# Patient Record
Sex: MALE | Race: WHITE | NOT HISPANIC OR LATINO | URBAN - METROPOLITAN AREA
[De-identification: names, ages, dates, MRNs, and addresses within clinical notes are randomized per-mention and may not be internally consistent; named-entity substitution may affect disease eponyms.]

---

## 2021-04-14 DIAGNOSIS — Z23 ENCOUNTER FOR IMMUNIZATION: ICD-10-CM

## 2021-06-02 ENCOUNTER — TRANSCRIBE ORDERS (OUTPATIENT)
Dept: SCHEDULING | Facility: REHABILITATION | Age: 71
End: 2021-06-02

## 2021-06-02 DIAGNOSIS — R41.841 COGNITIVE COMMUNICATION DEFICIT: Primary | ICD-10-CM

## 2021-07-02 ENCOUNTER — HOSPITAL ENCOUNTER (OUTPATIENT)
Dept: SPEECH THERAPY | Facility: REHABILITATION | Age: 71
Setting detail: THERAPIES SERIES
Discharge: HOME | End: 2021-07-02
Attending: PSYCHIATRY & NEUROLOGY
Payer: MEDICARE

## 2021-07-02 DIAGNOSIS — R41.841 COGNITIVE COMMUNICATION DEFICIT: ICD-10-CM

## 2021-07-02 PROCEDURE — 92523 SPEECH SOUND LANG COMPREHEN: CPT | Mod: GN

## 2021-07-02 NOTE — Clinical Note
[address] [place] [address]  [phone]      Dear DR. Moses,      Thank you for this referral. Please review the attached notes and plan of care for your approval.  Please contact our department with any questions.     Sincerely,     Lisa Cole, CCC-SLP    Referring Provider: By co-signing this Plan of Care (POC) either electronically or physically you agree to the following:    I have reviewed the the Plan of Care established by the therapist within this document and certify that the services are skilled and medically necessary. I have reviewed the plan and recommend that these services continue to meet the goals stated in this document.     EXTERNAL PROVIDER FAXING BACK:    PHYSICIAN SIGNATURE: __________________________________     DATE: ___________________   TIME: _________    IMPORTANT:  If returning this Plan of Care by fax, please fax back ONLY the signature page.   ________________________________________________________________      [facility] Fax: [phone]      SLP EVALUATION FOR OUTPATIENT THERAPY    Patient: Khanh Dela Cruz   MRN: [id number]  : 1950 71 y.o.  Referring Physician: Amita Moses MD  Date of Visit: 2021      Certification Dates:  21 through 10/01/21    Recommended Frequency & Duration:  2 times/week (1-2x per week) for up to 3 months        Diagnosis:   1. Cognitive communication deficit        Chief Complaints:   Chief Complaint   Patient presents with   • Memory Loss   • Cognition       Precautions: no known precautions/restrictions    Past Medical History: No past medical history on file.    Past Surgical History: No past surgical history on file.      LEARNING ASSESSMENT    Assessment completed: Yes    Learner name:  Khanh Dela Cruz    Relationship: Patient    Learning Barriers:  Learning barriers: No Barriers    Preferred Language: English     Needed: No    Learning New Concepts: Listening, Reading and  Demonstration      CO-LEARNER ASSESSMENT:    Completed: No      OBJECTIVE MEASUREMENTS/DATA:    Assessment - 07/04/21 0943        Assessment    Plan of Care reviewed and patient/family in agreement  Yes     Rehab Potential/Prognosis (SLP)  good, to achieve stated therapy goals     Problem List  Impaired cognition;Impaired communication     Clinical Assessment  Pt is a 71 y.o. male who presents to [facility] for speech therapy services to address recent complaints over approx. 1-2 years of worsening memory and word retrieval difficulties.  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  Pt reports memory difficulties and provided examples such as forgetting where his phone and keys are, as well as forgetting where he cruz when shopping.  Brief objective measures indicate difficulty with WM and immediate recall for specific information.  Pt also intimated possible attention difficulties, which may be negatively affecting both recall and exec function skills. Assessment of attention in upcoming session as well as executive function.  Results of Neuropsych assessment in 2/2021 indicated immediate memory and delayed memory in the Low Average range, brief attention in Low Average range, and for processing speed of a task assessing psychomotor speed and auditory attention, in the borderline range.  Executive functioning, which assessed attentional capacity and sequencing, pt scored in Extremely Low range.  Pt had reported an increase in depression over the last year, with psychologist noting depression as the primary contributor to pt's cognitive syptoms at that time.  Recommendations were made for improved functioning including atten and memory strategies.  It is anticipated that pt will benefit from skilled ST services to further assess recall, attention and exeutive function skills and to  "train in strategies for consistent use and carryover into home/work settings.     Planned Services  CPT 52500 Speech Lang Voice Comm and/or Auditory Proc (Treatment of speech, language, voice, communication and/or hearing processing disorder)         General Information - 07/02/21 0933        Session Details    Document Type  initial evaluation     Mode of Treatment  individual therapy;speech language pathology        SLP Time Calculation    Start Time  0815     Stop Time  0915     Time Calculation (min)  60 min        General Information    Referring Physician  Amita Moses MD     Pertinent History of Current Functional Problem  Pt is a 71 y.o. male who presents to [facility] to address recent memory difficulties.  Pt reports he was a  in a company in the 80s, was a  at [employer], and then broke off [employer].  He currently is semi-retired.  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  Pt reports increased difficulty with recalling what he is doing.  Examples provided included Googling something, but forgetting what he is googling, forgetting people's names, losing his car keys and phone, forgetting where he is parked.  Pt also reported it is getting more difficult to do things he had no difficulty doing previously (eg setting up accounts, etc).  Pt also reported that at times he feels \"foggy.\"  Pt reported having a neuropsychological assessment this year and has provided (discussed in \"assessment\" section).      Existing Precautions/Restrictions  no known precautions/restrictions         Pain and Vitals - 07/02/21 0815        Pain Assessment    Currently in pain  No/Denies        Pain Interventions    Intervention  none     Post Intervention Comments  none         SLP - 07/04/21 1017        SLP Frequency and Duration    Speech Duration  3 months     SLP Cert To  10/01/21     Date SLP POC was sent to provider  07/04/21     " "    Falls Assessment - 07/02/21 0942        Initial Falls Assessment    One or more falls in the last year  Yes     How many times  2 or more     Was the patient injured in any fall  No           Living Environment   Living Environment - 07/02/21 0940        Living Environment    People in Home  spouse     Living Arrangements  house     Transportation Concerns  car, none        Relationship/Environment    Name(s) of People in Home  Pt lives with wife Michelle MACIAS   Prior Level of Function - 07/02/21 0941        OTHER    Previous level of function  Pt currently \"semi-retired.\" 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         Language Assessment    Language Assessment - 07/04/21 0937        Verbal Expression    Comment, Intervention (Verbal Expression)  Pt reports word retrieval difficulties, but none noted during today's evaluation during interview, etc.  Will assess with SOPHIE F-A-S and instruct in compensatory strategies.        Auditory Comprehension    Follows Commands (Auditory Comprehension)  [facility]         Cognition   Executive Function and Cognition - 07/04/21 0921        Orientation    Orientation Comments:   AOx4        Memory    Immediate memory  RIPA 2 Immediate Memory Subtest: 28/30; HCLS Compelx Ideational Recall: recall of specific information via open-ended questions: 5/10 (50%)     Working memory  3 words: Pass; 4 words: Fail - pt failed to recall 1/4 words from string.        Attention    Sustained Attention  Further assess sustained attn, time managment, organization and planning skills.  Discussed that decreased attention may negatively affect recall.         Functional Comm Measures   Functional Communication Measures - 07/04/21 0939        Functional Communication Measures    FCM: Memory  5-->Level 5           TREATMENT PLAN:        GOALS:    Goals        General    •  ST STGs and LTGs (pt-stated)       Improve " memory    Short Term Goals Time Frame  Result  Comment/Progress    Pt. to demonstrate increased ST/Working memory to >/=85% with min cues/assist to apply strategies.  6-8 weeks New 7/2/2021: Noted deficits with WM and IM.   Pt. to demonstrate improved new learning/retention of information  to >/=85% with min cues for use of strategies.   (paragraph recall/NELL Talks, games) 6-8 weeks New     Pt. to demonstrate use of external memory aides at  mod I level to maintain current work/home schedules.    6-8 weeks New     Pt. to demonstrate intact higher level word retrieval/thought organization efficiency to >/=85% with min cues for use of strategies.  6-8 weeks New     Pt. to demonstrate intact auditory attention/concentration and focus for mid-higher level attention tasks to >85%% accuracy (for >30 mins) 6-8 weeks  New     Pt. to demonstrate intact auditory processing speed/comp to >/=85% via APT assessment.   6-8 weeks New     Pt. to utilize organization and planning skills to manage mod-high level tasks w/min assist for strategy use. 6-8 weeks New     LTG #1: FCM 6-7 for Memory 12 weeks New 7/2/2021: 5 for Memory   LTG #2: Pt to use compensatory cog comm strategies at mod I level for improved overall function in home/work settings.   12 weeks New                  EVALUATION AND ASSESSMENT:    Assessment - 07/04/21 0943        Assessment    Plan of Care reviewed and patient/family in agreement  Yes     Rehab Potential/Prognosis (SLP)  good, to achieve stated therapy goals     Problem List  Impaired cognition;Impaired communication     Clinical Assessment  Pt is a 71 y.o. male who presents to [facility] for speech therapy services to address recent complaints over approx. 1-2 years of worsening memory and word retrieval difficulties.  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  Pt reports  memory difficulties and provided examples such as forgetting where his phone and keys are, as well as forgetting where he cruz when shopping.  Brief objective measures indicate difficulty with WM and immediate recall for specific information.  Pt also intimated possible attention difficulties, which may be negatively affecting both recall and exec function skills. Assessment of attention in upcoming session as well as executive function.  Results of Neuropsych assessment in 2/2021 indicated immediate memory and delayed memory in the Low Average range, brief attention in Low Average range, and for processing speed on a task assessmig psychomotor speed and auditory attention, in the borderline range.  Executive functioning, which assessed attentional capcity and sequencing, pt scored in Extremely Low range.  Pt had reported an increase in depression over the last year, with psychologist noting deprssion as the primary contributor to pt's cognitive syptoms at that time.  Recommendations were made for improved functioning.  It is anticipated that pt will benefit from skilled ST services to further assess recall, attention and exeutive function skills and to train in strategies for consistent use and carryover into home/work settings.     Planned Services  CPT 20630 Speech Lang Voice Comm and/or Auditory Proc (Treatment of speech, language, voice, communication and/or hearing processing disorder)           This 71 y.o. year old male presents to ST with above stated diagnosis. Speech Therapy evaluation reveals Impaired cognition, Impaired communication resulting in functional limitations. Khanh Dela Cruz will benefit from skilled ST services to address limitation, work towards rehab and patient goals and maximize PLOF of chosen ADLs.     Planned Services:  Speech therapies will focus on CPT 61448 Speech Lang Voice Comm and/or Auditory Proc (Treatment of speech, language, voice, communication and/or hearing processing  disorder), .

## 2021-07-04 RX ORDER — OMEPRAZOLE 40 MG/1
40 CAPSULE, DELAYED RELEASE ORAL
COMMUNITY

## 2021-07-04 RX ORDER — METFORMIN HYDROCHLORIDE 500 MG/1
500 TABLET ORAL 2 TIMES DAILY WITH MEALS
COMMUNITY

## 2021-07-04 RX ORDER — POTASSIUM CHLORIDE 20 MEQ/1
20 TABLET, EXTENDED RELEASE ORAL 2 TIMES DAILY
COMMUNITY

## 2021-07-04 RX ORDER — HYDROCORTISONE VALERATE CREAM 2 MG/G
CREAM TOPICAL 2 TIMES DAILY
COMMUNITY

## 2021-07-04 RX ORDER — ECONAZOLE NITRATE 10 MG/G
CREAM TOPICAL DAILY
COMMUNITY

## 2021-07-04 RX ORDER — ESCITALOPRAM OXALATE 10 MG/1
10 TABLET ORAL DAILY
COMMUNITY

## 2021-07-04 RX ORDER — METOLAZONE 2.5 MG/1
2.5 TABLET ORAL DAILY
COMMUNITY

## 2021-07-04 RX ORDER — CLOTRIMAZOLE AND BETAMETHASONE DIPROPIONATE 10; .64 MG/G; MG/G
CREAM TOPICAL 2 TIMES DAILY
COMMUNITY

## 2021-07-04 RX ORDER — FUROSEMIDE 20 MG/1
20 TABLET ORAL DAILY
COMMUNITY

## 2021-07-04 RX ORDER — SULFASALAZINE 500 MG/1
500 TABLET ORAL 4 TIMES DAILY
COMMUNITY

## 2021-07-04 RX ORDER — ERYTHROMYCIN 5 MG/G
OINTMENT OPHTHALMIC NIGHTLY
COMMUNITY

## 2021-07-04 NOTE — PROGRESS NOTES
Referring Provider: By co-signing this Plan of Care (POC) either electronically or physically you agree to the following:    I have reviewed the the Plan of Care established by the therapist within this document and certify that the services are skilled and medically necessary. I have reviewed the plan and recommend that these services continue to meet the goals stated in this document.     EXTERNAL PROVIDER FAXING BACK:    PHYSICIAN SIGNATURE: __________________________________     DATE: ___________________   TIME: _________    IMPORTANT:  If returning this Plan of Care by fax, please fax back ONLY the signature page.   ________________________________________________________________      Ne[facility] Fax: [phone]      SLP EVALUATION FOR OUTPATIENT THERAPY    Patient: Khanh Dela Cruz   MRN: [id number]  : 1950 71 y.o.  Referring Physician: Amita Moses MD  Date of Visit: 2021      Certification Dates:  21 through 10/01/21    Recommended Frequency & Duration:  2 times/week (1-2x per week) for up to 3 months        Diagnosis:   1. Cognitive communication deficit        Chief Complaints:   Chief Complaint   Patient presents with   • Memory Loss   • Cognition       Precautions: no known precautions/restrictions    Past Medical History: No past medical history on file.    Past Surgical History: No past surgical history on file.      LEARNING ASSESSMENT    Assessment completed: Yes    Learner name:  Khanh Dela Cruz    Relationship: Patient    Learning Barriers:  Learning barriers: No Barriers    Preferred Language: English     Needed: No    Learning New Concepts: Listening, Reading and Demonstration      CO-LEARNER ASSESSMENT:    Completed: No      OBJECTIVE MEASUREMENTS/DATA:    Assessment - 21 0943        Assessment    Plan of Care reviewed and patient/family in agreement  Yes     Rehab Potential/Prognosis (SLP)  good, to achieve stated therapy goals      Problem List  Impaired cognition;Impaired communication     Clinical Assessment  Pt is a 71 y.o. male who presents to [facility] clinic for speech therapy services to address recent complaints over approx. 1-2 years of worsening memory and word retrieval difficulties.  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  Pt reports memory difficulties and provided examples such as forgetting where his phone and keys are, as well as forgetting where he cruz when shopping.  Brief objective measures indicate difficulty with WM and immediate recall for specific information.  Pt also intimated possible attention difficulties, which may be negatively affecting both recall and exec function skills. Assessment of attention in upcoming session as well as executive function.  Results of Neuropsych assessment in 2/2021 indicated immediate memory and delayed memory in the Low Average range, brief attention in Low Average range, and for processing speed of a task assessing psychomotor speed and auditory attention, in the borderline range.  Executive functioning, which assessed attentional capacity and sequencing, pt scored in Extremely Low range.  Pt had reported an increase in depression over the last year, with psychologist noting depression as the primary contributor to pt's cognitive syptoms at that time.  Recommendations were made for improved functioning including atten and memory strategies.  It is anticipated that pt will benefit from skilled ST services to further assess recall, attention and exeutive function skills and to train in strategies for consistent use and carryover into home/work settings.     Planned Services  CPT 41581 Speech Lang Voice Comm and/or Auditory Proc (Treatment of speech, language, voice, communication and/or hearing processing disorder)         General Information - 07/02/21  "0933        Session Details    Document Type  initial evaluation     Mode of Treatment  individual therapy;speech language pathology        SLP Time Calculation    Start Time  0815     Stop Time  0915     Time Calculation (min)  60 min        General Information    Referring Physician  Amita Moses MD     Pertinent History of Current Functional Problem  Pt is a 71 y.o. male who presents to [facility] to address recent memory difficulties.  Pt reports he was a  in a company in the 80s, was a  at [employer], and then broke off [employer].  He currently is semi-retired.  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  Pt reports increased difficulty with recalling what he is doing.  Examples provided included Googling something, but forgetting what he is googling, forgetting people's names, losing his car keys and phone, forgetting where he is parked.  Pt also reported it is getting more difficult to do things he had no difficulty doing previously (eg setting up accounts, etc).  Pt also reported that at times he feels \"foggy.\"  Pt reported having a neuropsychological assessment this year and has provided (discussed in \"assessment\" section).      Existing Precautions/Restrictions  no known precautions/restrictions         Pain and Vitals - 07/02/21 0815        Pain Assessment    Currently in pain  No/Denies        Pain Interventions    Intervention  none     Post Intervention Comments  none         SLP - 07/04/21 1017        SLP Frequency and Duration    Speech Duration  3 months     SLP Cert To  10/01/21     Date SLP POC was sent to provider  07/04/21         Falls Assessment - 07/02/21 0942        Initial Falls Assessment    One or more falls in the last year  Yes     How many times  2 or more     Was the patient injured in any fall  No           Living Environment   Living Environment - 07/02/21 0940        Living Environment    People " "in Home  spouse     Living Arrangements  house     Transportation Concerns  car, none        Relationship/Environment    Name(s) of People in Home  Pt lives with wife Michelle MACIAS   Prior Level of Function - 07/02/21 0941        OTHER    Previous level of function  Pt currently \"semi-retired.\" 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         Language Assessment    Language Assessment - 07/04/21 0937        Verbal Expression    Comment, Intervention (Verbal Expression)  Pt reports word retrieval difficulties, but none noted during today's evaluation during interview, etc.  Will assess with SOHPIE F-A-S and instruct in compensatory strategies.        Auditory Comprehension    Follows Commands (Auditory Comprehension)  [facility]         Cognition   Executive Function and Cognition - 07/04/21 0921        Orientation    Orientation Comments:   AOx4        Memory    Immediate memory  RIPA 2 Immediate Memory Subtest: 28/30; HCLS Compelx Ideational Recall: recall of specific information via open-ended questions: 5/10 (50%)     Working memory  3 words: Pass; 4 words: Fail - pt failed to recall 1/4 words from string.        Attention    Sustained Attention  Further assess sustained attn, time managment, organization and planning skills.  Discussed that decreased attention may negatively affect recall.         Functional Comm Measures   Functional Communication Measures - 07/04/21 0939        Functional Communication Measures    FCM: Memory  5-->Level 5           TREATMENT PLAN:        GOALS:    Goals        General    •  ST STGs and LTGs (pt-stated)       Improve memory    Short Term Goals Time Frame  Result  Comment/Progress    Pt. to demonstrate increased ST/Working memory to >/=85% with min cues/assist to apply strategies.  6-8 weeks New 7/2/2021: Noted deficits with WM and IM.   Pt. to demonstrate improved new learning/retention of " information  to >/=85% with min cues for use of strategies.   (paragraph recall/NELL Talks, games) 6-8 weeks New     Pt. to demonstrate use of external memory aides at  mod I level to maintain current work/home schedules.    6-8 weeks New     Pt. to demonstrate intact higher level word retrieval/thought organization efficiency to >/=85% with min cues for use of strategies.  6-8 weeks New     Pt. to demonstrate intact auditory attention/concentration and focus for mid-higher level attention tasks to >85%% accuracy (for >30 mins) 6-8 weeks  New     Pt. to demonstrate intact auditory processing speed/comp to >/=85% via APT assessment.   6-8 weeks New     Pt. to utilize organization and planning skills to manage mod-high level tasks w/min assist for strategy use. 6-8 weeks New     LTG #1: FCM 6-7 for Memory 12 weeks New 7/2/2021: 5 for Memory   LTG #2: Pt to use compensatory cog comm strategies at mod I level for improved overall function in home/work settings.   12 weeks New                  EVALUATION AND ASSESSMENT:    Assessment - 07/04/21 0943        Assessment    Plan of Care reviewed and patient/family in agreement  Yes     Rehab Potential/Prognosis (SLP)  good, to achieve stated therapy goals     Problem List  Impaired cognition;Impaired communication     Clinical Assessment  Pt is a 71 y.o. male who presents to [facility] for speech therapy services to address recent complaints over approx. 1-2 years of worsening memory and word retrieval difficulties.  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  Pt reports memory difficulties and provided examples such as forgetting where his phone and keys are, as well as forgetting where he cruz when shopping.  Brief objective measures indicate difficulty with WM and immediate recall for specific information.  Pt also intimated possible  attention difficulties, which may be negatively affecting both recall and exec function skills. Assessment of attention in upcoming session as well as executive function.  Results of Neuropsych assessment in 2/2021 indicated immediate memory and delayed memory in the Low Average range, brief attention in Low Average range, and for processing speed on a task assessmig psychomotor speed and auditory attention, in the borderline range.  Executive functioning, which assessed attentional capcity and sequencing, pt scored in Extremely Low range.  Pt had reported an increase in depression over the last year, with psychologist noting deprssion as the primary contributor to pt's cognitive syptoms at that time.  Recommendations were made for improved functioning.  It is anticipated that pt will benefit from skilled ST services to further assess recall, attention and exeutive function skills and to train in strategies for consistent use and carryover into home/work settings.     Planned Services  CPT 14607 Speech Lang Voice Comm and/or Auditory Proc (Treatment of speech, language, voice, communication and/or hearing processing disorder)           This 71 y.o. year old male presents to ST with above stated diagnosis. Speech Therapy evaluation reveals Impaired cognition, Impaired communication resulting in functional limitations. Khanh Dela Cruz will benefit from skilled ST services to address limitation, work towards rehab and patient goals and maximize PLOF of chosen ADLs.     Planned Services:  Speech therapies will focus on CPT 34774 Speech Lang Voice Comm and/or Auditory Proc (Treatment of speech, language, voice, communication and/or hearing processing disorder), .

## 2021-07-20 ENCOUNTER — HOSPITAL ENCOUNTER (OUTPATIENT)
Dept: SPEECH THERAPY | Facility: REHABILITATION | Age: 71
Setting detail: THERAPIES SERIES
Discharge: HOME | End: 2021-07-20
Attending: PSYCHIATRY & NEUROLOGY
Payer: MEDICARE

## 2021-07-20 DIAGNOSIS — R41.841 COGNITIVE COMMUNICATION DEFICIT: Primary | ICD-10-CM

## 2021-07-20 PROCEDURE — 92507 TX SP LANG VOICE COMM INDIV: CPT | Mod: GN

## 2021-07-20 NOTE — OP SLP TREATMENT LOG
"Cognitive flowsheet    COGNITIVE GOALS                       CURRENT SESSION   Pt. to demonstrate increased ST/Working memory to >/=85% with min cues/assist to apply strategies.  7/20/2021: Instructed in compensatory memory strategies and provided examples for each.  Pt reports using a calendar to keep up with appts. Pt continues to report decreased WM.  Discussed writing info down as he thinks of things (eg. Index card) for improved recall.     Pt. to demonstrate improved new learning/retention of information  to >/=85% with min cues for use of strategies.   (paragraph recall/NELL Talks, games)     Pt. to demonstrate use of external memory aides at  mod I level to maintain current work/home schedules.    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  Pt reports these two calendars work well for him. Pt showed SLP a bundle of business cards that had various notes on them.  Discussed taking notes from cards, which were not necessarily related to business card they were on, and placing in notes, or alternatively in calendar to \"jog\" patient to follow up if indicated.  Discussed use of phone alerts.     Pt. to demonstrate intact higher level word retrieval/thought organization efficiency to >/=85% with min cues for use of strategies.      Pt. to demonstrate intact auditory attention/concentration and focus for mid-higher level attention tasks to >85%% accuracy (for >30 mins) 7/20/2021: [place]  Discussed choosing schedule that would work best for him and to make it a routine (as recommended by neuropsych evaluation).  Also discussed reward when he completes task.  Discussed best times of day to work.     Pt. to demonstrate intact auditory processing speed/comp to >/=85% via APT assessment.       Pt. to utilize organization and planning skills to manage mod-high level " tasks w/min assist for strategy use.     Education    Other 7/20/21: Reviewed pt's neuropsychological evaluation and discussed strategies recommended in report, specifically behavioral modifications including daily routines to assist with completion of tasks, using a central location for phone, keys, etc.  Discussed placing phone in pocket vs room that he is in to help with phone location.

## 2021-07-21 NOTE — PROGRESS NOTES
SLP DAILY NOTE FOR OUTPATIENT THERAPY    Patient: Khanh Dela Cruz   MRN: [id number]  : 1950 71 y.o.  Referring Physician: Amita Moses MD  Date of Visit: 2021      Certification Dates: 21 through 10/01/21    Diagnosis:   1. Cognitive communication deficit        Chief Complaints:       Precautions:  Existing Precautions/Restrictions: no known precautions/restrictions       TODAY'S VISIT:    History/Vitals/Pain/Encounter Info - 21 1108        Injury History/Precautions/Daily Required Info    Primary Therapist  Lisa Cole MA CCC-SLP     Referring Physician  Amita Santizo MD     Existing Precautions/Restrictions  no known precautions/restrictions     Speech Therapy  Cognition     Document Type  daily treatment     Start Time  1105     Stop Time  1200     Time Calculation (min)  55 min     Patient reported fall since last visit  No        Pain Assessment    Currently in pain  No/Denies        Pain Interventions    Intervention  none     Post Intervention Comments  none         Daily Treatment Assessment and Plan - 21 1339        Daily Treatment Assessment and Plan    Progress toward goals  Progressing     Daily Outcome Summary  Instructed in compensatory memory strategies; discussed recommendations per neuropsych evaluation which might assist with completion of tasks, memory for placement of items, etc.     Plan and Recommendations  Assess word retrieval, attention and processing, WM and delayed recall.            OBJECTIVE MEASUREMENTS TAKEN TODAY:    Today's Treatment:    Cognitive flowsheet    COGNITIVE GOALS                       CURRENT SESSION   Pt. to demonstrate increased ST/Working memory to >/=85% with min cues/assist to apply strategies.  2021: Instructed in compensatory memory strategies and provided examples for each.  Pt reports using a calendar to keep up with appts. Pt continues to report decreased WM.  Discussed writing info down as he thinks  "of things (eg. Index card) for improved recall.     Pt. to demonstrate improved new learning/retention of information  to >/=85% with min cues for use of strategies.   (paragraph recall/NELL Talks, games)     Pt. to demonstrate use of external memory aides at  mod I level to maintain current work/home schedules.    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  Pt reports these two calendars work well for him. Pt showed SLP a bundle of business cards that had various notes on them.  Discussed taking notes from cards, which were not necessarily related to business card they were on, and placing in notes, or alternatively in calendar to \"jog\" patient to follow up if indicated.  Discussed use of phone alerts.     Pt. to demonstrate intact higher level word retrieval/thought organization efficiency to >/=85% with min cues for use of strategies.      Pt. to demonstrate intact auditory attention/concentration and focus for mid-higher level attention tasks to >85%% accuracy (for >30 mins) 7/20/2021: Pt [place]  Discussed choosing schedule that would work best for him and to make it a routine (as recommended by neuropsych evaluation).  Also discussed reward when he completes task.  Discussed best times of day to work.     Pt. to demonstrate intact auditory processing speed/comp to >/=85% via APT assessment.       Pt. to utilize organization and planning skills to manage mod-high level tasks w/min assist for strategy use.     Education    Other 7/20/21: Reviewed pt's neuropsychological evaluation and discussed strategies recommended in report, specifically behavioral modifications including daily routines to assist with completion of tasks, using a central location for phone, keys, etc.  Discussed placing phone in pocket vs room that he is in to help with phone location.   "

## 2021-07-22 ENCOUNTER — HOSPITAL ENCOUNTER (OUTPATIENT)
Dept: SPEECH THERAPY | Facility: REHABILITATION | Age: 71
Setting detail: THERAPIES SERIES
Discharge: HOME | End: 2021-07-22
Attending: PSYCHIATRY & NEUROLOGY
Payer: MEDICARE

## 2021-07-22 DIAGNOSIS — R41.841 COGNITIVE COMMUNICATION DEFICIT: Primary | ICD-10-CM

## 2021-07-22 PROCEDURE — 92507 TX SP LANG VOICE COMM INDIV: CPT | Mod: GN

## 2021-07-22 NOTE — OP SLP TREATMENT LOG
"Cognitive flowsheet    COGNITIVE GOALS                       CURRENT SESSION   Pt. to demonstrate increased ST/Working memory to >/=85% with min cues/assist to apply strategies.  7/20/2021: Instructed in compensatory memory strategies and provided examples for each.  Pt reports using a calendar to keep up with appts. Pt continues to report decreased WM.  Discussed writing info down as he thinks of things (eg. Index card) for improved recall.     Pt. to demonstrate improved new learning/retention of information  to >/=85% with min cues for use of strategies.   (paragraph recall/NELL Talks, games)     Pt. to demonstrate use of external memory aides at  mod I level to maintain current work/home schedules.    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  Pt reports these two calendars work well for him. Pt showed SLP a bundle of business cards that had various notes on them.  Discussed taking notes from cards, which were not necessarily related to business card they were on, and placing in notes, or alternatively in calendar to \"jog\" patient to follow up if indicated.  Discussed use of phone alerts.     Pt. to demonstrate intact higher level word retrieval/thought organization efficiency to >/=85% with min cues for use of strategies.  7/22/2021: SOPHIE F-A-S: 44 (ave 48: SD13) - WNL.    Pt. to demonstrate intact auditory attention/concentration and focus for mid-higher level attention tasks to >85%% accuracy (for >30 mins) 7/22/2021: Auditory Divided Attn: Orally presented names - pt had to listen for names beginning with an /m/ and names ending with a long /e/: 36/42 (86%); 34/38 (89%). Approx. 4 mins in length.    AQT: A-1 Color: 28 sec (<25 second) - slower than normal; Form: 53 sec (<30 sec) - non normal; Color/Form: 81 sec (<60 sec) - non normal.     7/20/2021: Pt [place]  Discussed choosing schedule that would work best for him and to make it a routine (as recommended by neuropsych evaluation).  Also discussed reward when he completes task.  Discussed best times of day to work.     Pt. to demonstrate intact auditory processing speed/comp to >/=85% via APT assessment.       Pt. to utilize organization and planning skills to manage mod-high level tasks w/min assist for strategy use.     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      7/20/21: Reviewed pt's neuropsychological evaluation and discussed strategies recommended in report, specifically behavioral modifications including daily routines to assist with completion of tasks, using a central location for phone, keys, etc.  Discussed placing phone in pocket vs room that he is in to help with phone location.

## 2021-07-23 NOTE — PROGRESS NOTES
SLP DAILY NOTE FOR OUTPATIENT THERAPY    Patient: Khanh Dela Cruz   MRN: [id number]  : 1950 71 y.o.  Referring Physician: Amita Moses MD  Date of Visit: 21    Certification Dates: 21 through 10/01/21    Diagnosis:   1. Cognitive communication deficit        Chief Complaints: No chief complaint on file.      Precautions: no known precautions/restrictions    General Information - 21 1011        Session Details    Document Type  daily treatment     Patient/Family/Caregiver Comments/Observations  Pleasant, cooperative.  Pt arrived approx.15 mins late to session.        SLP Time Calculation    Start Time  0915     Stop Time  1000     Time Calculation (min)  45 min        General Information    Referring Physician  Amita Moses MD     Existing Precautions/Restrictions  no known precautions/restrictions       Pain and Vitals - 21 1011        Pain Assessment    Currently in pain  No/Denies        Pain Interventions    Intervention  none     Post Intervention Comments  none       Daily Treatment Assessment and Plan - 21 1014        Daily Treatment Assessment and Plan    Progress toward goals  Progressing     Daily Outcome Summary  Assessment of auditory processing, AQT, MCLA F-A-S.     Plan and Recommendations  Address higher level cog comm skills including attention, processing, recall, EF.             Today's Treatment::    Cognitive flowsheet    COGNITIVE GOALS                       CURRENT SESSION   Pt. to demonstrate increased ST/Working memory to >/=85% with min cues/assist to apply strategies.  2021: Instructed in compensatory memory strategies and provided examples for each.  Pt reports using a calendar to keep up with appts. Pt continues to report decreased WM.  Discussed writing info down as he thinks of things (eg. Index card) for improved recall.     Pt. to demonstrate improved new learning/retention of information  to >/=85% with min cues for use of  "strategies.   (paragraph recall/NELL Talks, games)     Pt. to demonstrate use of external memory aides at  mod I level to maintain current work/home schedules.    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  Pt reports these two calendars work well for him. Pt showed SLP a bundle of business cards that had various notes on them.  Discussed taking notes from cards, which were not necessarily related to business card they were on, and placing in notes, or alternatively in calendar to \"jog\" patient to follow up if indicated.  Discussed use of phone alerts.     Pt. to demonstrate intact higher level word retrieval/thought organization efficiency to >/=85% with min cues for use of strategies.  7/22/2021: SOPHIE F-A-S: 44 (ave 48: SD13) - WNL.    Pt. to demonstrate intact auditory attention/concentration and focus for mid-higher level attention tasks to >85%% accuracy (for >30 mins) 7/22/2021: Auditory Divided Attn: Orally presented names - pt had to listen for names beginning with an /m/ and names ending with a long /e/: 36/42 (86%); 34/38 (89%). Approx. 4 mins in length.    AQT: A-1 Color: 28 sec (<25 second) - slower than normal; Form: 53 sec (<30 sec) - non normal; Color/Form: 81 sec (<60 sec) - non normal.     7/20/2021: Pt [place] it [place].  Discussed choosing schedule that would work best for him and to make it a routine (as recommended by neuropsych evaluation).  Also discussed reward when he completes task.  Discussed best times of day to work.     Pt. to demonstrate intact auditory processing speed/comp to >/=85% via APT assessment.       Pt. to utilize organization and planning skills to manage mod-high level tasks w/min assist for strategy use.     Education    Other 7/22/2021: Pt reported he will attempt to determine a schedule for [place] the beach where he will work a " specific # of hours, at a specific time (establishing a routine), and will report back upon return.      7/20/21: Reviewed pt's neuropsychological evaluation and discussed strategies recommended in report, specifically behavioral modifications including daily routines to assist with completion of tasks, using a central location for phone, keys, etc.  Discussed placing phone in pocket vs room that he is in to help with phone location.

## 2021-07-23 NOTE — PROGRESS NOTES
SLP DAILY NOTE FOR OUTPATIENT THERAPY    Patient: Khanh Dela Cruz   MRN: [id number]  : 1950 71 y.o.  Referring Physician: Amita Moses MD  Date of Visit: 2021      Certification Dates: 21 through 10/01/21    Diagnosis:   1. Cognitive communication deficit        Chief Complaints:  cognition    Precautions:  Existing Precautions/Restrictions: no known precautions/restrictions       TODAY'S VISIT:    History/Vitals/Pain/Encounter Info - 21 1011        Injury History/Precautions/Daily Required Info    Primary Therapist  Lisa Cole MA CCC-SLP     Chief Complaint/Reason for Visit   cognition     Referring Physician  Amita Moses MD     Existing Precautions/Restrictions  no known precautions/restrictions     Speech Therapy  Cognition     Document Type  daily treatment     Patient/Family/Caregiver Comments/Observations  Pleasant, cooperative.  Pt arrived approx.15 mins late to session.     Start Time  0915     Stop Time  1000     Time Calculation (min)  45 min     Patient reported fall since last visit  No        Pain Assessment    Currently in pain  No/Denies        Pain Interventions    Intervention  none     Post Intervention Comments  none         Daily Treatment Assessment and Plan - 21 1014        Daily Treatment Assessment and Plan    Progress toward goals  Progressing     Daily Outcome Summary  Assessment of auditory processing, AQT-3, MCLA F-A-S.     Plan and Recommendations  Address higher level cog comm skills including attention, processing, recall, EF.            OBJECTIVE MEASUREMENTS TAKEN TODAY:    Today's Treatment:    Cognitive flowsheet    COGNITIVE GOALS                       CURRENT SESSION   Pt. to demonstrate increased ST/Working memory to >/=85% with min cues/assist to apply strategies.  2021: Instructed in compensatory memory strategies and provided examples for each.  Pt reports using a calendar to keep up with appts. Pt continues to  "report decreased WM.  Discussed writing info down as he thinks of things (eg. Index card) for improved recall.     Pt. to demonstrate improved new learning/retention of information  to >/=85% with min cues for use of strategies.   (paragraph recall/NELL Talks, games)     Pt. to demonstrate use of external memory aides at  mod I level to maintain current work/home schedules.    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  Pt reports these two calendars work well for him. Pt showed SLP a bundle of business cards that had various notes on them.  Discussed taking notes from cards, which were not necessarily related to business card they were on, and placing in notes, or alternatively in calendar to \"jog\" patient to follow up if indicated.  Discussed use of phone alerts.     Pt. to demonstrate intact higher level word retrieval/thought organization efficiency to >/=85% with min cues for use of strategies.  7/22/2021: SOPHIE F-A-S: 44 (ave 48: SD13) - WNL.    Pt. to demonstrate intact auditory attention/concentration and focus for mid-higher level attention tasks to >85%% accuracy (for >30 mins) 7/22/2021: Auditory Divided Attn: Orally presented names - pt had to listen for names beginning with an /m/ and names ending with a long /e/: 36/42 (86%); 34/38 (89%). Approx. 4 mins in length.    AQT: A-1 Color: 28 sec (<25 second) - slower than normal; Form: 53 sec (<30 sec) - non normal; Color/Form: 81 sec (<60 sec) - non normal.     7/20/2021: Pt [place] it [place].  Discussed choosing schedule that would work best for him and to make it a routine (as recommended by neuropsych evaluation).  Also discussed reward when he completes task.  Discussed best times of day to work.     Pt. to demonstrate intact auditory processing speed/comp to >/=85% via APT assessment.       Pt. to utilize " organization and planning skills to manage mod-high level tasks w/min assist for strategy use.     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      7/20/21: Reviewed pt's neuropsychological evaluation and discussed strategies recommended in report, specifically behavioral modifications including daily routines to assist with completion of tasks, using a central location for phone, keys, etc.  Discussed placing phone in pocket vs room that he is in to help with phone location.

## 2021-08-03 ENCOUNTER — HOSPITAL ENCOUNTER (OUTPATIENT)
Dept: SPEECH THERAPY | Facility: REHABILITATION | Age: 71
Setting detail: THERAPIES SERIES
Discharge: HOME | End: 2021-08-03
Attending: PSYCHIATRY & NEUROLOGY
Payer: MEDICARE

## 2021-08-03 DIAGNOSIS — R41.841 COGNITIVE COMMUNICATION DEFICIT: Primary | ICD-10-CM

## 2021-08-03 PROCEDURE — 92507 TX SP LANG VOICE COMM INDIV: CPT | Mod: GN

## 2021-08-03 NOTE — OP SLP TREATMENT LOG
"Cognitive flowsheet    COGNITIVE GOALS                       CURRENT SESSION   Pt. to demonstrate increased ST/Working memory to >/=85% with min cues/assist to apply strategies.  7/20/2021: Instructed in compensatory memory strategies and provided examples for each.  Pt reports using a calendar to keep up with appts. Pt continues to report decreased WM.  Discussed writing info down as he thinks of things (eg. Index card) for improved recall.     Pt. to demonstrate improved new learning/retention of information  to >/=85% with min cues for use of strategies.   (paragraph recall/NELL Talks, games) 8/3/2021: Delayed recall of 4 pieces of information from artwork after 20 mins: 3/4 I'ly; 4/4 w/ability to self correct whe pt stated incorrect last name, and then able to correct when SLP asked to \"think it through.\"     Pt. to demonstrate use of external memory aides at  mod I level to maintain current work/home schedules.    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  Pt reports these two calendars work well for him. Pt showed SLP a bundle of business cards that had various notes on them.  Discussed taking notes from cards, which were not necessarily related to business card they were on, and placing in notes, or alternatively in calendar to \"jog\" patient to follow up if indicated.  Discussed use of phone alerts.     Pt. to demonstrate intact higher level word retrieval/thought organization efficiency to >/=85% with min cues for use of strategies.  7/22/2021: SOPHIE F-A-S: 44 (ave 48: SD13) - WNL.    Pt. to demonstrate intact auditory attention/concentration and focus for mid-higher level attention tasks to >85%% accuracy (for >30 mins) 7/22/2021: Auditory Divided Attn: Orally presented names - pt had to listen for names beginning with an /m/ and names ending with a long /e/: 36/42 (86%); 34/38 (89%). Approx. 4 mins in " length.    AQT: A-1 Color: 28 sec (<25 second) - slower than normal; Form: 53 sec (<30 sec) - non normal; Color/Form: 81 sec (<60 sec) - non normal.     7/20/2021: Pt reports finding it difficult to work from [place].  Discussed choosing schedule that would work best for him and to make it a routine (as recommended by neuropsych evaluation).  Also discussed reward when he completes task.  Discussed best times of day to work.     Pt. to demonstrate intact auditory processing speed/comp to >/=85% via APT assessment.   8/3/2021: card processing task: pt had to alternate between two categories generating items per card color (eg red: fruits/vegs, black: occupations): Pt completed in 6 minutes 24 seconds; pt repeated 10 generated items; errors x 2.     Pt. to utilize organization and planning skills to manage mod-high level tasks w/min assist for strategy use. 8/3/2021: EF/Dvided Attn: [place] Activity: Pt had to draw a line from letter A to number 1 and continue up through letter J to number 10: 100% accuracy. Pt reported that during STAC assessment today, he was unable to correct mistake.  Pt demonstrated intact abilities via paper/pencil.     Education    Other 8/3/2021: Standardized Touchscreen Assessment of Cognition (STAC).  See below.    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      7/20/21: Reviewed pt's neuropsychological evaluation and discussed strategies recommended in report, specifically behavioral modifications including daily routines to assist with completion of tasks, using a central location for phone, keys, etc.  Discussed placing phone in pocket vs room that he is in to help with phone location.         Standardized Touchscreen Assessment of Cognition (STAC)   Scores listed as percentile rankings  Domain: Scores:   Orientation 5/6      Language Conf. Naming 3/3  60%   Imm. Recall 3/3  50%   Lang. Word Fluency (cat.) 1       0%*   Lang. Word Fluency (let.) 10    33%   Vis. Scanning/spatial/attn 34/40 42%   STM aud. 2/3       %   Aud. Sustained (20)  5/5     88%   Aud. Sustained (30)  11/15 85%   Visual Attention Imm. Recall 3/3     50%   Visual Attn Work Mem    • Forward 4/8    32%   • Reverse  3/8    18%   Memory (Reverse sent.) 6/9     22%   Attn. Executive Function  5/14   10%**   STM vis. 2/3     16%   Temporal Orientation  1/1    64%     Percentile Ranking Key:   Above Average 77-95%, High end Average 53-75%, Average  39-50%, Low end Average 25-37%, Below Average (weakness) 10-23%, Poor  4-9%,Very Poor 0-3%    *Pt reported he had made a mistake and attempted to go back and correct spelling on Language Word Fluency (category).  He was only able to input 1 item.   **Exec Function: Pt reported he had made a mistake an attempted to go back and correct w/o success.  Assessed with paper/pencil trail activity: 100%.

## 2021-08-04 NOTE — PROGRESS NOTES
SLP PROGRESS NOTE FOR OUTPATIENT THERAPY    Patient: Khanh Dela Cruz   MRN: [id number]  : 1950 71 y.o.  Referring Physician: Amita Moses MD  Date of Visit: 8/3/2021      Certification Dates: 21 through 10/01/21    Recommended Frequency & Duration:  2 times/week for up to 3 months        Diagnosis:   1. Cognitive communication deficit        Chief Complaints:  No chief complaint on file.      Precautions:   Existing Precautions/Restrictions: no known precautions/restrictions    TODAY'S VISIT:    General Information - 21 1623        Session Details    Document Type  daily treatment     Patient/Family/Caregiver Comments/Observations  Pleasant, cooperative. Nothing new to report.        SLP Time Calculation    Start Time  1600     Stop Time  1700     Time Calculation (min)  60 min        General Information    Referring Physician  Amita Moses MD     Existing Precautions/Restrictions  no known precautions/restrictions           Pain and Vitals - 21 1623        Pain Assessment    Currently in pain  No/Denies        Pain Interventions    Intervention  n/a     Post Intervention Comments  n/a         SLP - 21 1623        Speech Therapy    Speech Therapy  Cognition        SLP Frequency and Duration    Frequency of treatment  2 times/week     Speech Duration  3 months     SLP Cert From  21     SLP Cert To  10/01/21     Date SLP POC was sent to provider  21     Signed SLP Plan of Care received?   No           OBJECTIVE MEASUREMENTS/DATA:    Today's Treatment:    Cognitive flowsheet    COGNITIVE GOALS                       CURRENT SESSION   Pt. to demonstrate increased ST/Working memory to >/=85% with min cues/assist to apply strategies.  2021: Instructed in compensatory memory strategies and provided examples for each.  Pt reports using a calendar to keep up with appts. Pt continues to report decreased WM.  Discussed writing info down as he thinks of things  "(eg. Index card) for improved recall.     Pt. to demonstrate improved new learning/retention of information  to >/=85% with min cues for use of strategies.   (paragraph recall/NELL Talks, games) 8/3/2021: Delayed recall of 4 pieces of information from artwork after 20 mins: 3/4 I'ly; 4/4 w/ability to self correct when pt stated incorrect last name, and then able to correct when SLP askedpt to \"think it through.\"     Pt. to demonstrate use of external memory aides at  mod I level to maintain current work/home schedules.    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  Pt reports these two calendars work well for him. Pt showed SLP a bundle of business cards that had various notes on them.  Discussed taking notes from cards, which were not necessarily related to business card they were on, and placing in notes, or alternatively in calendar to \"jog\" patient to follow up if indicated.  Discussed use of phone alerts.     Pt. to demonstrate intact higher level word retrieval/thought organization efficiency to >/=85% with min cues for use of strategies.  7/22/2021: MCLA F-A-S: 44 (ave 48: SD13) - WNL.    Pt. to demonstrate intact auditory attention/concentration and focus for mid-higher level attention tasks to >85%% accuracy (for >30 mins) 7/22/2021: Auditory Divided Attn: Orally presented names - pt had to listen for names beginning with an /m/ and names ending with a long /e/: 36/42 (86%); 34/38 (89%). Approx. 4 mins in length.    AQT: A-1 Color: 28 sec (<25 second) - slower than normal; Form: 53 sec (<30 sec) - non normal; Color/Form: 81 sec (<60 sec) - non normal.     7/20/2021: Pt [place]  Discussed choosing schedule that would work best for him and to make it a routine (as recommended by neuropsych evaluation).  Also discussed reward when he completes task.  Discussed " best times of day to work.     Pt. to demonstrate intact auditory processing speed/comp to >/=85% via APT assessment.   8/3/2021: card processing task: pt had to alternate between two categories generating items per card color (eg red: fruits/vegs, black: occupations): Pt completed in 6 minutes 24 seconds; pt repeated 10  items; errors x 2.     Pt. to utilize organization and planning skills to manage mod-high level tasks w/min assist for strategy use. 8/3/2021: EF/Dvided Attn: [place] Activity: Pt had to draw a line from letter A to number 1 and continue up through letter J to number 10: 100% accuracy. Pt reported that during STAC assessment today, he was unable to correct mistake.  Pt demonstrated intact abilities via paper/pencil.     Education    Other 8/3/2021: Standardized Touchscreen Assessment of Cognition (STAC).  See below.    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      7/20/21: Reviewed pt's neuropsychological evaluation and discussed strategies recommended in report, specifically behavioral modifications including daily routines to assist with completion of tasks, using a central location for phone, keys, etc.  Discussed placing phone in pocket vs room that he is in to help with phone location.         Standardized Touchscreen Assessment of Cognition (STAC)   Scores listed as percentile rankings  Domain: Scores:   Orientation 5/6     Language Conf. Naming 3/3  60%   Imm. Recall 3/3  50%   Lang. Word Fluency (cat.) 1       0%*   Lang. Word Fluency (let.) 10    33%   Vis. Scanning/spatial/attn 34/40 42%   STM aud. 2/3       %   Aud. Sustained (20)  5/5     88%   Aud. Sustained (30)  11/15 85%   Visual Attention Imm. Recall 3/3     50%   Visual Attn Work Mem    • Forward 4/8    32%   • Reverse  3/8    18%   Memory (Reverse sent.) 6/9     22%    Attn. Executive Function  5/14   10%**   STM vis. 2/3     16%   Temporal Orientation  1/1    64%     Percentile Ranking Key:   Above Average 77-95%, High end Average 53-75%, Average  39-50%, Low end Average 25-37%, Below Average (weakness) 10-23%, Poor  4-9%,Very Poor 0-3%    *Pt reported he had made a mistake and attempted to go back and correct spelling on Language Word Fluency (category).  He was only able to input 1 item.   **Exec Function: Pt reported he had made a mistake an attempted to go back and correct w/o success.  Assessed with paper/pencil trail activity: 100%.        Goals Addressed                    This Visit's Progress       General    •  ST STGs and LTGs (pt-stated)         Improve memory    Short Term Goals Time Frame  Result  Comment/Progress    Pt. to demonstrate increased ST/Working memory to >/=85% with min cues/assist to apply strategies.  6-8 weeks Ongoing 8/3/2021:  See STAC results in flowsheet.    Instructed in internal and external memory strategies.   Pt. to demonstrate improved new learning/retention of information  to >/=85% with min cues for use of strategies.   (paragraph recall/NELL Talks, games) 6-8 weeks Ongoing 8/3/2021: Delayed recall of 4 novel pieces of info after 20 mins: 3/4 initially; 4/4 w/cues to think through (x 1 session only)   Pt. to demonstrate use of external memory aides at  mod I level to maintain current work/home schedules.    6-8 weeks Ongoing 8/3/2021: Pt reports having two calendars to maintain work/home schedules.    Pt. to demonstrate intact higher level word retrieval/thought organization efficiency to >/=85% with min cues for use of strategies.  6-8 weeks Ongoing 8/3/2021: MCLA F-A-S administered: WNL.    Pt. to demonstrate intact auditory attention/concentration and focus for mid-higher level attention tasks to >85%% accuracy (for >30 mins) 6-8 weeks  Ongoing 8/3/2021: AQT-3: scores in slower than to non-normal range.  See flowsheet above.    Divided  Aud attn: 87% avg.     Aud processing - card sorting (52 cards): pt had to generate items for two diff categories given specific card drawn: 6.24 seconds to complete. Pt duplicated 10 of her responses, as well as two items generated in error.     Pt. to demonstrate intact auditory processing speed/comp to >/=85% via APT assessment.   6-8 weeks Ongoing     Pt. to utilize organization and planning skills to manage mod-high level tasks w/min assist for strategy use. 6-8 weeks Ongoing EF/Divided Attn: Trail making (paper/pencil): 100% (x 1 session).   LTG #1: FCM 6-7 for Memory 12 weeks Ongoing 7/2/2021: 5 for Memory   LTG #2: Pt to use compensatory cog comm strategies at mod I level for improved overall function in home/work settings.   12 weeks Ongoing      Clinical Impression: Pt seen for limited sessions due to scheduling difficulties. Pt's main complaints are recall, as well as ability to complete tasks.  STAC administered this session with scores lower than anticipated.  Focus of upcoming sessions will be on instruction and consistent use of memory strategies for delayed recall and WM tasks, further assessment of processing speeds, and setting of routines for improved completion of tasks and for functional performance in both home and work settings.  It is anticipated that pt will continue to benefit from skilled ST services 2x per week.

## 2021-08-05 ENCOUNTER — HOSPITAL ENCOUNTER (OUTPATIENT)
Dept: SPEECH THERAPY | Facility: REHABILITATION | Age: 71
Setting detail: THERAPIES SERIES
Discharge: HOME | End: 2021-08-05
Attending: PSYCHIATRY & NEUROLOGY
Payer: MEDICARE

## 2021-08-05 DIAGNOSIS — R41.841 COGNITIVE COMMUNICATION DEFICIT: Primary | ICD-10-CM

## 2021-08-05 PROCEDURE — 92507 TX SP LANG VOICE COMM INDIV: CPT | Mod: GN

## 2021-08-05 NOTE — OP SLP TREATMENT LOG
"Cognitive flowsheet    COGNITIVE GOALS                       CURRENT SESSION   Pt. to demonstrate increased ST/Working memory to >/=85% with min cues/assist to apply strategies.  7/20/2021: Instructed in compensatory memory strategies and provided examples for each.  Pt reports using a calendar to keep up with appts. Pt continues to report decreased WM.  Discussed writing info down as he thinks of things (eg. Index card) for improved recall.      Mental Manipulation: Opposites with a Delay (n-back 1) Pt had to provide the opposite for first word presented:  88% (44/50). Mild delays noted.     Pt. to demonstrate improved new learning/retention of information  to >/=85% with min cues for use of strategies.   (paragraph recall/NELL Talks, games) 8/5/2021: Delayed recall of short reading passage (\"[place]mo[place] [employer]\"): Pt was able to recall 5/6 (83%) facts after 25 mins.    Delayed recall of 4 pieces of info from last session: 3/4 I'ly; w/min verbal cue: 4/4.      8/3/2021: Delayed recall of 4 pieces of information from artwork after 20 mins: 3/4 I'ly; 4/4 w/ability to self correct whe pt stated incorrect last name, and then able to correct when SLP asked to \"think it through.\"     Pt. to demonstrate use of external memory aides at  mod I level to maintain current work/home schedules.    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  Pt reports these two calendars work well for him. Pt showed SLP a bundle of business cards that had various notes on them.  Discussed taking notes from cards, which were not necessarily related to business card they were on, and placing in notes, or alternatively in calendar to \"jog\" patient to follow up if indicated.  Discussed use of phone alerts.     Pt. to demonstrate intact higher level word retrieval/thought organization efficiency to >/=85% with min cues for use of strategies.  7/22/2021: " "SOPHIE F-A-S: 44 (ave 48: SD13) - WNL.    Pt. to demonstrate intact auditory and visual attention/concentration and focus for mid-higher level attention tasks to >85%% accuracy (for >30 mins) 8/5/2021: Visual attn task: \"3s and 5s\" Pt had to locate all numbers including both 3 and 5 from a visually confusing page.  Pt used strategy of making columns on page to help w/finding all numbers: 1st attempt: 20/21 (95%); second attempt: 21/21.  Pt had more difficulty with second part of task - placing #s in ascending order: 19/21 (90%). Discussed more logical approach to task than what pt had done (eg. Listing 2 digit numbers, then 3 digit numbers, etc)  Mildly extended time to complete due to disorganized approach.      7/22/2021: Auditory Divided Attn: Orally presented names - pt had to listen for names beginning with an /m/ and names ending with a long /e/: 36/42 (86%); 34/38 (89%). Approx. 4 mins in length.    AQT: A-1 Color: 28 sec (<25 second) - slower than normal; Form: 53 sec (<30 sec) - non normal; Color/Form: 81 sec (<60 sec) - non normal.     7/20/2021: Pt reports finding it difficult to work [place].  Discussed choosing schedule that would work best for him and to make it a routine (as recommended by neuropsych evaluation).  Also discussed reward when he completes task.  Discussed best times of day to work.     Pt. to demonstrate intact auditory processing speed/comp to >/=85% via APT assessment.   8/3/2021: card processing task: pt had to alternate between two categories generating items per card color (eg red: fruits/vegs, black: occupations): Pt completed in 6 minutes 24 seconds; pt repeated 10 generated items; errors x 2.     Pt. to utilize organization and planning skills to manage mod-high level tasks w/min assist for strategy use. 8/3/2021: EF/Dvided Attn: [place] Activity: Pt had to draw a line from letter A to number 1 and continue up through letter J to number 10: 100% accuracy. Pt reported " that during STAC assessment today, he was unable to correct mistake.  Pt demonstrated intact abilities via paper/pencil.     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 SLP instructed pt to write out a schedule that was achievable and determine deadline in order to complete in a timely manner and provide structure since he reports this is very difficult for him to do.  He reported that he was going to the office to decrease distractions.      8/3/2021: Standardized Touchscreen Assessment of Cognition (STAC).  See below.    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      7/20/21: Reviewed pt's neuropsychological evaluation and discussed strategies recommended in report, specifically behavioral modifications including daily routines to assist with completion of tasks, using a central location for phone, keys, etc.  Discussed placing phone in pocket vs room that he is in to help with phone location.         Standardized Touchscreen Assessment of Cognition (STAC)   Scores listed as percentile rankings  Domain: Scores:   Orientation 5/6     Language Conf. Naming 3/3  60%   Imm. Recall 3/3  50%   Lang. Word Fluency (cat.) 1       0%*   Lang. Word Fluency (let.) 10    33%   Vis. Scanning/spatial/attn 34/40 42%   STM aud. 2/3       %   Aud. Sustained (20)  5/5     88%   Aud. Sustained (30)  11/15 85%   Visual Attention Imm. Recall 3/3     50%   Visual Attn Work Mem    • Forward 4/8    32%   • Reverse  3/8    18%   Memory (Reverse sent.) 6/9     22%   Attn. Executive Function  5/14   10%**   STM vis. 2/3     16%   Temporal Orientation  1/1    64%     Percentile Ranking Key:   Above Average 77-95%, High end Average 53-75%, Average  39-50%, Low end Average 25-37%, Below Average (weakness) 10-23%, Poor   4-9%,Very Poor 0-3%    *Pt reported he had made a mistake and attempted to go back and correct spelling on Language Word Fluency (category).  He was only able to input 1 item.   **Exec Function: Pt reported he had made a mistake an attempted to go back and correct w/o success.  Assessed with paper/pencil trail activity: 100%.

## 2021-08-06 NOTE — PROGRESS NOTES
SLP DAILY NOTE FOR OUTPATIENT THERAPY    Patient: Khanh Dela Cruz   MRN: [id number]  : 1950 71 y.o.  Referring Physician: Amita Moses MD  Date of Visit: 2021      Certification Dates: 21 through 10/01/21    Diagnosis:   1. Cognitive communication deficit        Chief Complaints:  cognition    Precautions:  Existing Precautions/Restrictions: no known precautions/restrictions       TODAY'S VISIT:    History/Vitals/Pain/Encounter Info - 21 1406        Injury History/Precautions/Daily Required Info    Primary Therapist  Lisa Cole MA CCC-SLP     Chief Complaint/Reason for Visit   cognition     Referring Physician  Amita Moses MD     Existing Precautions/Restrictions  no known precautions/restrictions     Speech Therapy  Cognition     Document Type  daily treatment     Patient/Family/Caregiver Comments/Observations  Pleasant, cooperative.  Nothing new to report.     Start Time  1402     Stop Time  1500     Time Calculation (min)  58 min     Patient reported fall since last visit  No        Pain Assessment    Currently in pain  No/Denies        Pain Interventions    Intervention  n/a     Post Intervention Comments  n/a         Daily Treatment Assessment and Plan - 218        Daily Treatment Assessment and Plan    Progress toward goals  Progressing     Daily Outcome Summary  Pt did well with delayed recall task after discussion, use of verbal repetition strategy and association strategy.  Disorganized approach noted when listing #s in ascending order.     Plan and Recommendations  Address higher level cog comm skills including memory, attention, processing, EF            OBJECTIVE MEASUREMENTS TAKEN TODAY:    Today's Treatment:    Cognitive flowsheet    COGNITIVE GOALS                       CURRENT SESSION   Pt. to demonstrate increased ST/Working memory to >/=85% with min cues/assist to apply strategies.  2021: Instructed in compensatory memory strategies  "and provided examples for each.  Pt reports using a calendar to keep up with appts. Pt continues to report decreased WM.  Discussed writing info down as he thinks of things (eg. Index card) for improved recall.      Mental Manipulation: Opposites with a Delay (n-back 1) Pt had to provide the opposite for first word presented:  88% (44/50). Mild delays noted.     Pt. to demonstrate improved new learning/retention of information  to >/=85% with min cues for use of strategies.   (paragraph recall/NELL Talks, games) 8/5/2021: Delayed recall of short reading passage (\"[place]mokey the Bear\"): Pt was able to recall 5/6 (83%) facts after 25 mins.    Delayed recall of 4 pieces of info from last session: 3/4 I'ly; w/min verbal cue: 4/4.      8/3/2021: Delayed recall of 4 pieces of information from artwork after 20 mins: 3/4 I'ly; 4/4 w/ability to self correct whe pt stated incorrect last name, and then able to correct when SLP asked to \"think it through.\"     Pt. to demonstrate use of external memory aides at  mod I level to maintain current work/home schedules.    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  Pt reports these two calendars work well for him. Pt showed SLP a bundle of business cards that had various notes on them.  Discussed taking notes from cards, which were not necessarily related to business card they were on, and placing in notes, or alternatively in calendar to \"jog\" patient to follow up if indicated.  Discussed use of phone alerts.     Pt. to demonstrate intact higher level word retrieval/thought organization efficiency to >/=85% with min cues for use of strategies.  7/22/2021: SOPHIE F-A-S: 44 (ave 48: SD13) - WNL.    Pt. to demonstrate intact auditory and visual attention/concentration and focus for mid-higher level attention tasks to >85%% accuracy (for >30 mins) 8/5/2021: Visual attn task: \"3s and 5s\" Pt had " to locate all numbers including both 3 and 5 from a visually confusing page.  Pt used strategy of making columns on page to help w/finding all numbers: 1st attempt: 20/21 (95%); second attempt: 21/21.  Pt had more difficulty with second part of task - placing #s in ascending order: 19/21 (90%). Discussed more logical approach to task than what pt had done (eg. Listing 2 digit numbers, then 3 digit numbers, etc)  Mildly extended time to complete due to disorganized approach.      7/22/2021: Auditory Divided Attn: Orally presented names - pt had to listen for names beginning with an /m/ and names ending with a long /e/: 36/42 (86%); 34/38 (89%). Approx. 4 mins in length.    AQT: A-1 Color: 28 sec (<25 second) - slower than normal; Form: 53 sec (<30 sec) - non normal; Color/Form: 81 sec (<60 sec) - non normal.     7/20/2021: Pt [place].  Discussed choosing schedule that would work best for him and to make it a routine (as recommended by neuropsych evaluation).  Also discussed reward when he completes task.  Discussed best times of day to work.     Pt. to demonstrate intact auditory processing speed/comp to >/=85% via APT assessment.   8/3/2021: card processing task: pt had to alternate between two categories generating items per card color (eg red: fruits/vegs, black: occupations): Pt completed in 6 minutes 24 seconds; pt repeated 10 generated items; errors x 2.     Pt. to utilize organization and planning skills to manage mod-high level tasks w/min assist for strategy use. 8/3/2021: EF/Dvided Attn: [place] Activity: Pt had to draw a line from letter A to number 1 and continue up through letter J to number 10: 100% accuracy. Pt reported that during STAC assessment today, he was unable to correct mistake.  Pt demonstrated intact abilities via paper/pencil.     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 SLP  instructed pt to write out a schedule that was achievable and determine deadline in order to complete in a timely manner and provide structure since he reports this is very difficult for him to do.  He reported that he was going to the office to decrease distractions.      8/3/2021: Standardized Touchscreen Assessment of Cognition (STAC).  See below.    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      7/20/21: Reviewed pt's neuropsychological evaluation and discussed strategies recommended in report, specifically behavioral modifications including daily routines to assist with completion of tasks, using a central location for phone, keys, etc.  Discussed placing phone in pocket vs room that he is in to help with phone location.         Standardized Touchscreen Assessment of Cognition (STAC)   Scores listed as percentile rankings  Domain: Scores:   Orientation 5/6     Language Conf. Naming 3/3  60%   Imm. Recall 3/3  50%   Lang. Word Fluency (cat.) 1       0%*   Lang. Word Fluency (let.) 10    33%   Vis. Scanning/spatial/attn 34/40 42%   STM aud. 2/3       %   Aud. Sustained (20)  5/5     88%   Aud. Sustained (30)  11/15 85%   Visual Attention Imm. Recall 3/3     50%   Visual Attn Work Mem    • Forward 4/8    32%   • Reverse  3/8    18%   Memory (Reverse sent.) 6/9     22%   Attn. Executive Function  5/14   10%**   STM vis. 2/3     16%   Temporal Orientation  1/1    64%     Percentile Ranking Key:   Above Average 77-95%, High end Average 53-75%, Average  39-50%, Low end Average 25-37%, Below Average (weakness) 10-23%, Poor  4-9%,Very Poor 0-3%    *Pt reported he had made a mistake and attempted to go back and correct spelling on Language Word Fluency (category).  He was only able to input 1 item.   **Exec Function: Pt reported he had made a mistake an  attempted to go back and correct w/o success.  Assessed with paper/pencil trail activity: 100%.

## 2021-08-10 ENCOUNTER — HOSPITAL ENCOUNTER (OUTPATIENT)
Dept: SPEECH THERAPY | Facility: REHABILITATION | Age: 71
Setting detail: THERAPIES SERIES
Discharge: HOME | End: 2021-08-10
Attending: PSYCHIATRY & NEUROLOGY
Payer: MEDICARE

## 2021-08-10 DIAGNOSIS — R41.841 COGNITIVE COMMUNICATION DEFICIT: Primary | ICD-10-CM

## 2021-08-10 PROCEDURE — 92507 TX SP LANG VOICE COMM INDIV: CPT | Mod: GN

## 2021-08-10 NOTE — PROGRESS NOTES
SLP DAILY NOTE FOR OUTPATIENT THERAPY    Patient: Khanh Dela Cruz   MRN: [id number]  : 1950 71 y.o.  Referring Physician: Amita Moses MD  Date of Visit: 8/10/2021      Certification Dates: 21 through 10/01/21    Diagnosis:   1. Cognitive communication deficit        Chief Complaints:  cognition    Precautions:  Existing Precautions/Restrictions: no known precautions/restrictions       TODAY'S VISIT:    History/Vitals/Pain/Encounter Info - 08/10/21 1307        Injury History/Precautions/Daily Required Info    Primary Therapist  Lisa oCle MA CCC-SLP     Chief Complaint/Reason for Visit   cognition     Existing Precautions/Restrictions  no known precautions/restrictions     Speech Therapy  Cognition     Document Type  daily treatment     Patient/Family/Caregiver Comments/Observations  Pt reported that he had a good week last week, but this week feels difficult. Pt stated that he has been forgetting stuff a little more this. SLP walked pt to psychology for a card. Pt had verbalized increased problems with stress.     Start Time  1304     Stop Time  1400     Time Calculation (min)  56 min     Patient reported fall since last visit  No        Pain Assessment    Currently in pain  No/Denies        Pain Interventions    Intervention  none     Post Intervention Comments  none         Daily Treatment Assessment and Plan - 08/10/21 1405        Daily Treatment Assessment and Plan    Progress toward goals  Progressing     Daily Outcome Summary  Discussed importance of mental health with cognition. Pt given card for psychology department.     Plan and Recommendations  Continue to address high level cognitve communication skills.          Today's Treatment:    Cognitive flowsheet    COGNITIVE GOALS                       CURRENT SESSION   Pt. to demonstrate increased ST/Working memory to >/=85% with min cues/assist to apply strategies.  2021: Instructed in compensatory memory strategies and  "provided examples for each.  Pt reports using a calendar to keep up with appts. Pt continues to report decreased WM.  Discussed writing info down as he thinks of things (eg. Index card) for improved recall.      Mental Manipulation: Opposites with a Delay (n-back 1) Pt had to provide the opposite for first word presented:  88% (44/50). Mild delays noted.     Pt. to demonstrate improved new learning/retention of information  to >/=85% with min cues for use of strategies.   (paragraph recall/NELL Talks, games) 8/10/21: \"set\" with F3 items for picking 3rd item 9/13 ind; naming 3rd item with visual cue (chart of options): 5/5 ind; Naming 3rd item with no visual cue: 10/10 ind 2x self correctionsx. Pt required mod-max A with grid.     8/5/2021: Delayed recall of short reading passage (\"Smokey the Bear\"): Pt was able to recall 5/6 (83%) facts after 25 mins.    Delayed recall of 4 pieces of info from last session: 3/4 I'ly; w/min verbal cue: 4/4.      8/3/2021: Delayed recall of 4 pieces of information from artwork after 20 mins: 3/4 I'ly; 4/4 w/ability to self correct whe pt stated incorrect last name, and then able to correct when SLP asked to \"think it through.\"     Pt. to demonstrate use of external memory aides at  mod I level to maintain current work/home schedules.    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  Pt reports these two calendars work well for him. Pt showed SLP a bundle of business cards that had various notes on them.  Discussed taking notes from cards, which were not necessarily related to business card they were on, and placing in notes, or alternatively in calendar to \"jog\" patient to follow up if indicated.  Discussed use of phone alerts.     Pt. to demonstrate intact higher level word retrieval/thought organization efficiency to >/=85% with min cues for use of strategies.  7/22/2021: SOPHIE GERBER: 44 " "(ave 48: SD13) - WNL.    Pt. to demonstrate intact auditory and visual attention/concentration and focus for mid-higher level attention tasks to >85%% accuracy (for >30 mins) 8/5/2021: Visual attn task: \"3s and 5s\" Pt had to locate all numbers including both 3 and 5 from a visually confusing page.  Pt used strategy of making columns on page to help w/finding all numbers: 1st attempt: 20/21 (95%); second attempt: 21/21.  Pt had more difficulty with second part of task - placing #s in ascending order: 19/21 (90%). Discussed more logical approach to task than what pt had done (eg. Listing 2 digit numbers, then 3 digit numbers, etc)  Mildly extended time to complete due to disorganized approach.      7/22/2021: Auditory Divided Attn: Orally presented names - pt had to listen for names beginning with an /m/ and names ending with a long /e/: 36/42 (86%); 34/38 (89%). Approx. 4 mins in length.    AQT: A-1 Color: 28 sec (<25 second) - slower than normal; Form: 53 sec (<30 sec) - non normal; Color/Form: 81 sec (<60 sec) - non normal.     7/20/2021: Pt [place]  Discussed choosing schedule that would work best for him and to make it a routine (as recommended by neuropsych evaluation).  Also discussed reward when he completes task.  Discussed best times of day to work.     Pt. to demonstrate intact auditory processing speed/comp to >/=85% via APT assessment.   8/3/2021: card processing task: pt had to alternate between two categories generating items per card color (eg red: fruits/vegs, black: occupations): Pt completed in 6 minutes 24 seconds; pt repeated 10 generated items; errors x 2.     Pt. to utilize organization and planning skills to manage mod-high level tasks w/min assist for strategy use. 8/3/2021: EF/Dvided Attn: [place] Activity: Pt had to draw a line from letter A to number 1 and continue up through letter J to number 10: 100% accuracy. Pt reported that during STAC " "assessment today, he was unable to correct mistake.  Pt demonstrated intact abilities via paper/pencil.     Education 8/10/21: Pt asked about his \"condition\" and SLP used the recommendations in the neuropsychology evaluation to ask if pt had completed any counseling. Pt stated he did not, but proceeded to discuss multiple stressors (Pandemic, tenants in his building passing away from Covid, difficulties with work, tax difficulties at work). SLP recommended he consider the impact of anxiety and stress on his ability to fully attend and recall and that he seek out counseling services to see if they are helpful.    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 SLP instructed pt to write out a schedule that was achievable and determine deadline in order to complete in a timely manner and provide structure since he reports this is very difficult for him to do.  He reported that he was going to the office to decrease distractions.      8/3/2021: Standardized Touchscreen Assessment of Cognition (STAC).  See below.    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      7/20/21: Reviewed pt's neuropsychological evaluation and discussed strategies recommended in report, specifically behavioral modifications including daily routines to assist with completion of tasks, using a central location for phone, keys, etc.  Discussed placing phone in pocket vs room that he is in to help with phone location.       "

## 2021-08-10 NOTE — OP SLP TREATMENT LOG
"Cognitive flowsheet    COGNITIVE GOALS                       CURRENT SESSION   Pt. to demonstrate increased ST/Working memory to >/=85% with min cues/assist to apply strategies.  7/20/2021: Instructed in compensatory memory strategies and provided examples for each.  Pt reports using a calendar to keep up with appts. Pt continues to report decreased WM.  Discussed writing info down as he thinks of things (eg. Index card) for improved recall.      Mental Manipulation: Opposites with a Delay (n-back 1) Pt had to provide the opposite for first word presented:  88% (44/50). Mild delays noted.     Pt. to demonstrate improved new learning/retention of information  to >/=85% with min cues for use of strategies.   (paragraph recall/NELL Talks, games) 8/10/21: \"set\" with F3 items for picking 3rd item 9/13 ind; naming 3rd item with visual cue (chart of options): 5/5 ind; Naming 3rd item with no visual cue: 10/10 ind 2x self correctionsx. Pt required mod-max A with grid.     8/5/2021: Delayed recall of short reading passage (\"Smokey the Bear\"): Pt was able to recall 5/6 (83%) facts after 25 mins.    Delayed recall of 4 pieces of info from last session: 3/4 I'ly; w/min verbal cue: 4/4.      8/3/2021: Delayed recall of 4 pieces of information from artwork after 20 mins: 3/4 I'ly; 4/4 w/ability to self correct whe pt stated incorrect last name, and then able to correct when SLP asked to \"think it through.\"     Pt. to demonstrate use of external memory aides at  mod I level to maintain current work/home schedules.    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  Pt reports these two calendars work well for him. Pt showed SLP a bundle of business cards that had various notes on them.  Discussed taking notes from cards, which were not necessarily related to business card they were on, and placing in notes, or alternatively in calendar " "to \"jog\" patient to follow up if indicated.  Discussed use of phone alerts.     Pt. to demonstrate intact higher level word retrieval/thought organization efficiency to >/=85% with min cues for use of strategies.  7/22/2021: SOPHIE F-A-S: 44 (ave 48: SD13) - WNL.    Pt. to demonstrate intact auditory and visual attention/concentration and focus for mid-higher level attention tasks to >85%% accuracy (for >30 mins) 8/5/2021: Visual attn task: \"3s and 5s\" Pt had to locate all numbers including both 3 and 5 from a visually confusing page.  Pt used strategy of making columns on page to help w/finding all numbers: 1st attempt: 20/21 (95%); second attempt: 21/21.  Pt had more difficulty with second part of task - placing #s in ascending order: 19/21 (90%). Discussed more logical approach to task than what pt had done (eg. Listing 2 digit numbers, then 3 digit numbers, etc)  Mildly extended time to complete due to disorganized approach.      7/22/2021: Auditory Divided Attn: Orally presented names - pt had to listen for names beginning with an /m/ and names ending with a long /e/: 36/42 (86%); 34/38 (89%). Approx. 4 mins in length.    AQT: A-1 Color: 28 sec (<25 second) - slower than normal; Form: 53 sec (<30 sec) - non normal; Color/Form: 81 sec (<60 sec) - non normal.     7/20/2021: Pt reports finding it difficult to work [place].  Discussed choosing schedule that would work best for him and to make it a routine (as recommended by neuropsych evaluation).  Also discussed reward when he completes task.  Discussed best times of day to work.     Pt. to demonstrate intact auditory processing speed/comp to >/=85% via APT assessment.   8/3/2021: card processing task: pt had to alternate between two categories generating items per card color (eg red: fruits/vegs, black: occupations): Pt completed in 6 minutes 24 seconds; pt repeated 10 generated items; errors x 2.     Pt. to utilize organization and planning skills to " "manage mod-high level tasks w/min assist for strategy use. 8/3/2021: EF/Dvided Attn: [place] Activity: Pt had to draw a line from letter A to number 1 and continue up through letter J to number 10: 100% accuracy. Pt reported that during STAC assessment today, he was unable to correct mistake.  Pt demonstrated intact abilities via paper/pencil.     Education 8/10/21: Pt asked about his \"condition\" and SLP used the recommendations in the neuropsychology evaluation to ask if pt had completed any counseling. Pt stated he did not, but proceeded to discuss multiple stressors (Pandemic, tenants in his building passing away from Covid, difficulties with work, tax difficulties at work). SLP recommended he consider the impact of anxiety and stress on his ability to fully attend and recall and that he seek out counseling services to see if they are helpful.    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 SLP instructed pt to write out a schedule that was achievable and determine deadline in order to complete in a timely manner and provide structure since he reports this is very difficult for him to do.  He reported that he was going to the office to decrease distractions.      8/3/2021: Standardized Touchscreen Assessment of Cognition (STAC).  See below.    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      7/20/21: Reviewed pt's neuropsychological evaluation and discussed strategies recommended in report, specifically behavioral modifications including daily routines to assist with completion of tasks, using a central location for phone, keys, etc.  Discussed placing phone in pocket vs room that he is in to help with phone location.       "

## 2021-08-12 ENCOUNTER — HOSPITAL ENCOUNTER (OUTPATIENT)
Dept: SPEECH THERAPY | Facility: REHABILITATION | Age: 71
Setting detail: THERAPIES SERIES
Discharge: HOME | End: 2021-08-12
Attending: PSYCHIATRY & NEUROLOGY
Payer: MEDICARE

## 2021-08-12 DIAGNOSIS — R41.841 COGNITIVE COMMUNICATION DEFICIT: Primary | ICD-10-CM

## 2021-08-12 PROCEDURE — 92507 TX SP LANG VOICE COMM INDIV: CPT | Mod: GN

## 2021-08-12 NOTE — PROGRESS NOTES
"SLP DAILY NOTE FOR OUTPATIENT THERAPY    Patient: Khanh Dela Cruz   MRN: [id number]  : 1950 71 y.o.  Referring Physician: Amita Moses MD  Date of Visit: 2021      Certification Dates: 21 through 10/01/21    Diagnosis:   1. Cognitive communication deficit        Chief Complaints:  cognition    Precautions:  Existing Precautions/Restrictions: no known precautions/restrictions       TODAY'S VISIT:    History/Vitals/Pain/Encounter Info - 21 1008        Injury History/Precautions/Daily Required Info    Primary Therapist  Lisa Cole MA CCC-SLP     Chief Complaint/Reason for Visit   cognition     Existing Precautions/Restrictions  no known precautions/restrictions     Limitations/Impairments  glasses     Speech Therapy  Cognition     Document Type  daily treatment     Patient/Family/Caregiver Comments/Observations  Pt arrived today bringing his laptop with him.  Reports he will be going to [facility] for the next few week and that he will email the primary therapist when he will be back in [place] to schedule therapy sessions.  Wore 2 hats today putting one on from the car saying 'he must have forgotten that he already had one on.\"     Start Time  1003     Stop Time  1100     Time Calculation (min)  57 min     Patient reported fall since last visit  No        Pain Assessment    Currently in pain  No/Denies        Pain Interventions    Intervention  none     Post Intervention Comments  none         Daily Treatment Assessment and Plan - 21 1125        Daily Treatment Assessment and Plan    Progress toward goals  Progressing     Daily Outcome Summary  Pt would benefit from continued education re: attention and memory compensatory strategies.  May also benefit psychology services.     Plan and Recommendations  Continue to address attention and memory compensatory strategies for functional tasks.            OBJECTIVE MEASUREMENTS TAKEN TODAY:    None Taken    Today's " "Treatment:    Cognitive flowsheet    COGNITIVE GOALS                       CURRENT SESSION   Pt. to demonstrate increased ST/Working memory to >/=85% with min cues/assist to apply strategies.  7/20/2021: Instructed in compensatory memory strategies and provided examples for each.  Pt reports using a calendar to keep up with appts. Pt continues to report decreased WM.  Discussed writing info down as he thinks of things (eg. Index card) for improved recall.      Mental Manipulation: Opposites with a Delay (n-back 1) Pt had to provide the opposite for first word presented:  88% (44/50). Mild delays noted.     Pt. to demonstrate improved new learning/retention of information  to >/=85% with min cues for use of strategies.   (paragraph recall/NELL Talks, games) 8/10/21: \"set\" with F3 items for picking 3rd item 9/13 ind; naming 3rd item with visual cue (chart of options): 5/5 ind; Naming 3rd item with no visual cue: 10/10 ind 2x self correctionsx. Pt required mod-max A with grid.     8/5/2021: Delayed recall of short reading passage (\"Smokey the Bear\"): Pt was able to recall 5/6 (83%) facts after 25 mins.    Delayed recall of 4 pieces of info from last session: 3/4 I'ly; w/min verbal cue: 4/4.      8/3/2021: Delayed recall of 4 pieces of information from artwork after 20 mins: 3/4 I'ly; 4/4 w/ability to self correct whe pt stated incorrect last name, and then able to correct when SLP asked to \"think it through.\"     Pt. to demonstrate use of external memory aides at  mod I level to maintain current work/home schedules.    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  Pt reports these two calendars work well for him. Pt showed SLP a bundle of business cards that had various notes on them.  Discussed taking notes from cards, which were not necessarily related to business card they were on, and placing in notes, or alternatively " "in calendar to \"jog\" patient to follow up if indicated.  Discussed use of phone alerts.     Pt. to demonstrate intact higher level word retrieval/thought organization efficiency to >/=85% with min cues for use of strategies.  7/22/2021: SOPHIE F-A-S: 44 (ave 48: SD13) - WNL.    Pt. to demonstrate intact auditory and visual attention/concentration and focus for mid-higher level attention tasks to >85%% accuracy (for >30 mins) 8/12/21: mod level attention task Find the Sentence=50%, errors due to decreased attention to detail and organization, able to increase to 100% with mod-max structure.    8/5/2021: Visual attn task: \"3s and 5s\" Pt had to locate all numbers including both 3 and 5 from a visually confusing page.  Pt used strategy of making columns on page to help w/finding all numbers: 1st attempt: 20/21 (95%); second attempt: 21/21.  Pt had more difficulty with second part of task - placing #s in ascending order: 19/21 (90%). Discussed more logical approach to task than what pt had done (eg. Listing 2 digit numbers, then 3 digit numbers, etc)  Mildly extended time to complete due to disorganized approach.      7/22/2021: Auditory Divided Attn: Orally presented names - pt had to listen for names beginning with an /m/ and names ending with a long /e/: 36/42 (86%); 34/38 (89%). Approx. 4 mins in length.    AQT: A-1 Color: 28 sec (<25 second) - slower than normal; Form: 53 sec (<30 sec) - non normal; Color/Form: 81 sec (<60 sec) - non normal.     7/20/2021: Pt reports [place] it [place].  Discussed choosing schedule that would work best for him and to make it a routine (as recommended by neuropsych evaluation).  Also discussed reward when he completes task.  Discussed best times of day to work.     Pt. to demonstrate intact auditory processing speed/comp to >/=85% via APT assessment.   8/3/2021: card processing task: pt had to alternate between two categories generating items per card " "color (eg red: fruits/vegs, black: occupations): Pt completed in 6 minutes 24 seconds; pt repeated 10 generated items; errors x 2.     Pt. to utilize organization and planning skills to manage mod-high level tasks w/min assist for strategy use. 8/3/2021: EF/Dvided Attn: [place] Activity: Pt had to draw a line from letter A to number 1 and continue up through letter J to number 10: 100% accuracy. Pt reported that during STAC assessment today, he was unable to correct mistake.  Pt demonstrated intact abilities via paper/pencil.     Education 8/12/21: Pt continuing to ask if he will \"get better\".  Appears anxious today and mildly tearful at one point.  Discuss the need to use the strategies he is practicing in therapy to be more successful in the areas he is concerned with which is mostly memory.  Educated how important attention is in relation to memory.  Reviewed attention and memory strategies. He will need continued reinforcement of this education.    8/10/21: Pt asked about his \"condition\" and SLP used the recommendations in the neuropsychology evaluation to ask if pt had completed any counseling. Pt stated he did not, but proceeded to discuss multiple stressors (Pandemic, tenants in his building passing away from Covid, difficulties with work, tax difficulties at work). SLP recommended he consider the impact of anxiety and stress on his ability to fully attend and recall and that he seek out counseling services to see if they are helpful.    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 SLP instructed pt to write out a schedule that was achievable and determine deadline in order to complete in a timely manner and provide structure since he reports this is very difficult for him to do.  He reported that he was going to the office to decrease distractions.      8/3/2021: Standardized Touchscreen Assessment of Cognition (STAC).  See below.    7/22/2021: Pt reported he " will attempt to determine a schedule for [place] where he will work a specific # of hours, at a specific time (establishing a routine), and will report back upon return.      7/20/21: Reviewed pt's neuropsychological evaluation and discussed strategies recommended in report, specifically behavioral modifications including daily routines to assist with completion of tasks, using a central location for phone, keys, etc.  Discussed placing phone in pocket vs room that he is in to help with phone location.

## 2021-08-12 NOTE — OP SLP TREATMENT LOG
"Cognitive flowsheet    COGNITIVE GOALS                       CURRENT SESSION   Pt. to demonstrate increased ST/Working memory to >/=85% with min cues/assist to apply strategies.  7/20/2021: Instructed in compensatory memory strategies and provided examples for each.  Pt reports using a calendar to keep up with appts. Pt continues to report decreased WM.  Discussed writing info down as he thinks of things (eg. Index card) for improved recall.      Mental Manipulation: Opposites with a Delay (n-back 1) Pt had to provide the opposite for first word presented:  88% (44/50). Mild delays noted.     Pt. to demonstrate improved new learning/retention of information  to >/=85% with min cues for use of strategies.   (paragraph recall/NELL Talks, games) 8/10/21: \"set\" with F3 items for picking 3rd item 9/13 ind; naming 3rd item with visual cue (chart of options): 5/5 ind; Naming 3rd item with no visual cue: 10/10 ind 2x self correctionsx. Pt required mod-max A with grid.     8/5/2021: Delayed recall of short reading passage (\"Smokey the Bear\"): Pt was able to recall 5/6 (83%) facts after 25 mins.    Delayed recall of 4 pieces of info from last session: 3/4 I'ly; w/min verbal cue: 4/4.      8/3/2021: Delayed recall of 4 pieces of information from artwork after 20 mins: 3/4 I'ly; 4/4 w/ability to self correct whe pt stated incorrect last name, and then able to correct when SLP asked to \"think it through.\"     Pt. to demonstrate use of external memory aides at  mod I level to maintain current work/home schedules.    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  Pt reports these two calendars work well for him. Pt showed SLP a bundle of business cards that had various notes on them.  Discussed taking notes from cards, which were not necessarily related to business card they were on, and placing in notes, or alternatively in calendar " "to \"jog\" patient to follow up if indicated.  Discussed use of phone alerts.     Pt. to demonstrate intact higher level word retrieval/thought organization efficiency to >/=85% with min cues for use of strategies.  7/22/2021: SOPHIE F-A-S: 44 (ave 48: SD13) - WNL.    Pt. to demonstrate intact auditory and visual attention/concentration and focus for mid-higher level attention tasks to >85%% accuracy (for >30 mins) 8/12/21: mod level attention task Find the Sentence=50%, errors due to decreased attention to detail and organization, able to increase to 100% with mod-max structure.    8/5/2021: Visual attn task: \"3s and 5s\" Pt had to locate all numbers including both 3 and 5 from a visually confusing page.  Pt used strategy of making columns on page to help w/finding all numbers: 1st attempt: 20/21 (95%); second attempt: 21/21.  Pt had more difficulty with second part of task - placing #s in ascending order: 19/21 (90%). Discussed more logical approach to task than what pt had done (eg. Listing 2 digit numbers, then 3 digit numbers, etc)  Mildly extended time to complete due to disorganized approach.      7/22/2021: Auditory Divided Attn: Orally presented names - pt had to listen for names beginning with an /m/ and names ending with a long /e/: 36/42 (86%); 34/38 (89%). Approx. 4 mins in length.    AQT: A-1 Color: 28 sec (<25 second) - slower than normal; Form: 53 sec (<30 sec) - non normal; Color/Form: 81 sec (<60 sec) - non normal.     7/20/2021: [place]  Discussed choosing schedule that would work best for him and to make it a routine (as recommended by neuropsych evaluation).  Also discussed reward when he completes task.  Discussed best times of day to work.     Pt. to demonstrate intact auditory processing speed/comp to >/=85% via APT assessment.   8/3/2021: card processing task: pt had to alternate between two categories generating items per card color (eg red: " "fruits/vegs, black: occupations): Pt completed in 6 minutes 24 seconds; pt repeated 10 generated items; errors x 2.     Pt. to utilize organization and planning skills to manage mod-high level tasks w/min assist for strategy use. 8/3/2021: EF/Dvided Attn: [place] Activity: Pt had to draw a line from letter A to number 1 and continue up through letter J to number 10: 100% accuracy. Pt reported that during STAC assessment today, he was unable to correct mistake.  Pt demonstrated intact abilities via paper/pencil.     Education 8/12/21: Pt continuing to ask if he will \"get better\".  Appears anxious today and mildly tearful at one point.  Discuss the need to use the strategies he is practicing in therapy to be more successful in the areas he is concerned with which is mostly memory.  Educated how important attention is in relation to memory.  Reviewed attention and memory strategies. He will need continued reinforcement of this education.    8/10/21: Pt asked about his \"condition\" and SLP used the recommendations in the neuropsychology evaluation to ask if pt had completed any counseling. Pt stated he did not, but proceeded to discuss multiple stressors (Pandemic, tenants in his building passing away from Covid, difficulties with work, tax difficulties at work). SLP recommended he consider the impact of anxiety and stress on his ability to fully attend and recall and that he seek out counseling services to see if they are helpful.    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 SLP instructed pt to write out a schedule that was achievable and determine deadline in order to complete in a timely manner and provide structure since he reports this is very difficult for him to do.  He reported that he was going to the office to decrease distractions.      8/3/2021: Standardized Touchscreen Assessment of Cognition (STAC).  See below.    7/22/2021: Pt reported he will attempt to " [place] a [place] where he will work a specific # of hours, at a specific time (establishing a routine), and will report back upon return.      7/20/21: Reviewed pt's neuropsychological evaluation and discussed strategies recommended in report, specifically behavioral modifications including daily routines to assist with completion of tasks, using a central location for phone, keys, etc.  Discussed placing phone in pocket vs room that he is in to help with phone location.

## 2021-08-31 ENCOUNTER — HOSPITAL ENCOUNTER (OUTPATIENT)
Dept: SPEECH THERAPY | Facility: REHABILITATION | Age: 71
Setting detail: THERAPIES SERIES
Discharge: HOME | End: 2021-08-31
Attending: PSYCHIATRY & NEUROLOGY
Payer: MEDICARE

## 2021-08-31 DIAGNOSIS — R41.841 COGNITIVE COMMUNICATION DEFICIT: Primary | ICD-10-CM

## 2021-08-31 PROCEDURE — 92507 TX SP LANG VOICE COMM INDIV: CPT | Mod: GN

## 2021-09-01 NOTE — PROGRESS NOTES
SLP DAILY NOTE FOR OUTPATIENT THERAPY    Patient: Khanh Dela Cruz   MRN: [id number]  : 1950 71 y.o.  Referring Physician: Amita Moses MD  Date of Visit: 2021      Certification Dates: 21 through 10/01/21    Diagnosis:   1. Cognitive communication deficit        Chief Complaints:  cognition    Precautions:  Existing Precautions/Restrictions: no known precautions/restrictions       TODAY'S VISIT:    History/Vitals/Pain/Encounter Info - 21 1532        Injury History/Precautions/Daily Required Info    Primary Therapist  Lisa Cole MA CCC-SLP     Chief Complaint/Reason for Visit   cognition     Referring Physician  Amita Moses MD     Existing Precautions/Restrictions  no known precautions/restrictions     Limitations/Impairments  glasses     Speech Therapy  Cognition     Document Type  daily treatment     Start Time  1503     Stop Time  1600     Time Calculation (min)  57 min     Patient reported fall since last visit  No        Pain Assessment    Currently in pain  No/Denies        Pain Interventions    Intervention  none     Post Intervention Comments  none         Daily Treatment Assessment and Plan - 21 1536        Daily Treatment Assessment and Plan    Progress toward goals  Progressing     Daily Outcome Summary  Pt was instructed to verbally rehearse words during WM tasks, which he found difficult.  Revisited SET game to assess new learning from previous session.     Plan and Recommendations  Continue to address attention and memory compensatory strategies for functional tasks.  Discuss possible psychology services in upcoming session.            OBJECTIVE MEASUREMENTS TAKEN TODAY:    Today's Treatment:    Cognitive flowsheet     COGNITIVE GOALS                       CURRENT SESSION   Pt. to demonstrate increased ST/Working memory to >/=85% with min cues/assist to apply strategies.  2021: WM: 4 words - Category Inclusion: 60% (6/10) w/repetition  "provided.  4 words: Rankin% (6/10). Pt demonstrated difficulty 'holding onto words\" in order to manipulate.      2021: Instructed in compensatory memory strategies and provided examples for each.  Pt reports using a calendar to keep up with appts. Pt continues to report decreased WM.  Discussed writing info down as he thinks of things (eg. Index card) for improved recall.       Mental Manipulation: Opposites with a Delay (n-back 1) Pt had to provide the opposite for first word presented:  88% (44/50). Mild delays noted.     Pt. to demonstrate improved new learning/retention of information  to >/=85% with min cues for use of strategies.   (paragraph recall/NELL Talks, games) 8/3/2021: Revisited SET game.  Reviewed card characteristics and pt's ability to make a set given two cards. Began game play: Pt required mod assist to determine a SET, however, he was able to state when incorrectly choosing cards for a SET why it could not be a SET based on card traits.    8/10/21: \"set\" with F3 items for picking 3rd item  ind; naming 3rd item with visual cue (chart of options):  ind; Naming 3rd item with no visual cue: 10/10 ind 2x self correctionsx. Pt required mod-max A with grid.      2021: Delayed recall of short reading passage (\"Smokey the Bear\"): Pt was able to recall 5/6 (83%) facts after 25 mins.    Delayed recall of 4 pieces of info from last session: 3/ I'ly; w/min verbal cue: 4/4.       8/3/2021: Delayed recall of 4 pieces of information from artwork after 20 mins: 3/4 I'ly; 4/4 w/ability to self correct whe pt stated incorrect last name, and then able to correct when SLP asked to \"think it through.\"     Pt. to demonstrate use of external memory aides at  mod I level to maintain current work/home schedules.    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  Pt reports these two calendars work well " "for him. Pt showed SLP a bundle of business cards that had various notes on them.  Discussed taking notes from cards, which were not necessarily related to business card they were on, and placing in notes, or alternatively in calendar to \"jog\" patient to follow up if indicated.  Discussed use of phone alerts.     Pt. to demonstrate intact higher level word retrieval/thought organization efficiency to >/=85% with min cues for use of strategies.  7/22/2021: MACIELA F-A-S: 44 (ave 48: SD13) - WNL.    Pt. to demonstrate intact auditory and visual attention/concentration and focus for mid-higher level attention tasks to >85%% accuracy (for >30 mins) 8/12/21: mod level attention task Find the Sentence=50%, errors due to decreased attention to detail and organization, able to increase to 100% with mod-max structure.     8/5/2021: Visual attn task: \"3s and 5s\" Pt had to locate all numbers including both 3 and 5 from a visually confusing page.  Pt used strategy of making columns on page to help w/finding all numbers: 1st attempt: 20/21 (95%); second attempt: 21/21.  Pt had more difficulty with second part of task - placing #s in ascending order: 19/21 (90%). Discussed more logical approach to task than what pt had done (eg. Listing 2 digit numbers, then 3 digit numbers, etc)  Mildly extended time to complete due to disorganized approach.       7/22/2021: Auditory Divided Attn: Orally presented names - pt had to listen for names beginning with an /m/ and names ending with a long /e/: 36/42 (86%); 34/38 (89%). Approx. 4 mins in length.     AQT: A-1 Color: 28 sec (<25 second) - slower than normal; Form: 53 sec (<30 sec) - non normal; Color/Form: 81 sec (<60 sec) - non normal.      7/20/2021: Pt [place].  Discussed choosing schedule that would work best for him and to make it a routine (as recommended by neuropsych evaluation).  Also discussed reward when he completes task.  " "Discussed best times of day to work.     Pt. to demonstrate intact auditory processing speed/comp to >/=85% via APT assessment.   8/3/2021: card processing task: pt had to alternate between two categories generating items per card color (eg red: fruits/vegs, black: occupations): Pt completed in 6 minutes 24 seconds; pt repeated 10 generated items; errors x 2.     Pt. to utilize organization and planning skills to manage mod-high level tasks w/min assist for strategy use. 8/3/2021: EF/Dvided Attn: [place] Activity: Pt had to draw a line from letter A to number 1 and continue up through letter J to number 10: 100% accuracy. Pt reported that during STAC assessment today, he was unable to correct mistake.  Pt demonstrated intact abilities via paper/pencil.     Education 8/31/21: Pt continues to ask questions regarding current cognitive functioning.  Again, discussed that strategy use will help with current difficulties with memory and attention. Pt may benefit from psychology services to decrease anxiety.  Pt had reported that he completed 5-6 tax returns, but that new ones have come in.  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      8/12/21: Pt continuing to ask if he will \"get better\". Appears anxious today and mildly tearful at one point.  Discuss the need to use the strategies he is practicing in therapy to be more successful in the areas he is concerned with which is mostly memory.  Educated how important attention is in relation to memory.  Reviewed attention and memory strategies. He will need continued reinforcement of this education.     8/10/21: Pt asked about his \"condition\" and SLP used the recommendations in the neuropsychology evaluation to ask if pt had completed any counseling. Pt stated he did not, but proceeded to discuss multiple stressors (Pandemic, tenants in his building passing away from " Covid, difficulties with work, tax difficulties at work). SLP recommended he consider the impact of anxiety and stress on his ability to fully attend and recall and that he seek out counseling services to see if they are helpful.    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 SLP instructed pt to write out a schedule that was achievable and determine deadline in order to complete in a timely manner and provide structure since he reports this is very difficult for him to do.  He reported that he was going to the office to decrease distractions.       8/3/2021: Standardized Touchscreen Assessment of Cognition (STAC).  See below.     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       7/20/21: Reviewed pt's neuropsychological evaluation and discussed strategies recommended in report, specifically behavioral modifications including daily routines to assist with completion of tasks, using a central location for phone, keys, etc.  Discussed placing phone in pocket vs room that he is in to help with phone location.

## 2021-09-01 NOTE — OP SLP TREATMENT LOG
"Cognitive flowsheet     COGNITIVE GOALS                       CURRENT SESSION   Pt. to demonstrate increased ST/Working memory to >/=85% with min cues/assist to apply strategies.  2021: WM: 4 words - Category Inclusion: 60% (6/10) w/repetition provided.  4 words: Rankin% (6/10). Pt demonstrated difficulty 'holding onto words\" in order to manipulate.      2021: Instructed in compensatory memory strategies and provided examples for each.  Pt reports using a calendar to keep up with appts. Pt continues to report decreased WM.  Discussed writing info down as he thinks of things (eg. Index card) for improved recall.       Mental Manipulation: Opposites with a Delay (n-back 1) Pt had to provide the opposite for first word presented:  88% (44/50). Mild delays noted.     Pt. to demonstrate improved new learning/retention of information  to >/=85% with min cues for use of strategies.   (paragraph recall/NELL Talks, games) 8/3/2021: Revisited SET game.  Reviewed card characteristics and pt's ability to make a set given two cards. Began game play: Pt required mod assist to determine a SET, however, he was able to state when incorrectly choosing cards for a SET why it could not be a SET based on card traits.    8/10/21: \"set\" with F3 items for picking 3rd item  ind; naming 3rd item with visual cue (chart of options): 5 ind; Naming 3rd item with no visual cue: 10/10 ind 2x self correctionsx. Pt required mod-max A with grid.      2021: Delayed recall of short reading passage (\"Smokey the Bear\"): Pt was able to recall 5/6 (83%) facts after 25 mins.    Delayed recall of 4 pieces of info from last session: 3/ I'ly; w/min verbal cue: /.       8/3/2021: Delayed recall of 4 pieces of information from artwork after 20 mins: 3/4 I'ly; 4/4 w/ability to self correct whe pt stated incorrect last name, and then able to correct when SLP asked to \"think it through.\"     Pt. to demonstrate use of external memory aides " "at  mod I level to maintain current work/home schedules.    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  Pt reports these two calendars work well for him. Pt showed SLP a bundle of business cards that had various notes on them.  Discussed taking notes from cards, which were not necessarily related to business card they were on, and placing in notes, or alternatively in calendar to \"jog\" patient to follow up if indicated.  Discussed use of phone alerts.     Pt. to demonstrate intact higher level word retrieval/thought organization efficiency to >/=85% with min cues for use of strategies.  7/22/2021: SOPHIE F-A-S: 44 (ave 48: SD13) - WNL.    Pt. to demonstrate intact auditory and visual attention/concentration and focus for mid-higher level attention tasks to >85%% accuracy (for >30 mins) 8/12/21: mod level attention task Find the Sentence=50%, errors due to decreased attention to detail and organization, able to increase to 100% with mod-max structure.     8/5/2021: Visual attn task: \"3s and 5s\" Pt had to locate all numbers including both 3 and 5 from a visually confusing page.  Pt used strategy of making columns on page to help w/finding all numbers: 1st attempt: 20/21 (95%); second attempt: 21/21.  Pt had more difficulty with second part of task - placing #s in ascending order: 19/21 (90%). Discussed more logical approach to task than what pt had done (eg. Listing 2 digit numbers, then 3 digit numbers, etc)  Mildly extended time to complete due to disorganized approach.       7/22/2021: Auditory Divided Attn: Orally presented names - pt had to listen for names beginning with an /m/ and names ending with a long /e/: 36/42 (86%); 34/38 (89%). Approx. 4 mins in length.     AQT: A-1 Color: 28 sec (<25 second) - slower than normal; Form: 53 sec (<30 sec) - non normal; Color/Form: 81 sec (<60 sec) - non normal. " "     7/20/2021: Pt reports finding it difficult to work [place].  Discussed choosing schedule that would work best for him and to make it a routine (as recommended by neuropsych evaluation).  Also discussed reward when he completes task.  Discussed best times of day to work.     Pt. to demonstrate intact auditory processing speed/comp to >/=85% via APT assessment.   8/3/2021: card processing task: pt had to alternate between two categories generating items per card color (eg red: fruits/vegs, black: occupations): Pt completed in 6 minutes 24 seconds; pt repeated 10 generated items; errors x 2.     Pt. to utilize organization and planning skills to manage mod-high level tasks w/min assist for strategy use. 8/3/2021: EF/Dvided Attn: [place] Activity: Pt had to draw a line from letter A to number 1 and continue up through letter J to number 10: 100% accuracy. Pt reported that during STAC assessment today, he was unable to correct mistake.  Pt demonstrated intact abilities via paper/pencil.     Education 8/31/21: Pt continues to ask questions regarding current cognitive functioning.  Again, discussed that strategy use will help with current difficulties with memory and attention. Pt may benefit from psychology services to decrease anxiety.  Pt had reported that he completed 5-6 tax returns, but that new ones have come in.  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      8/12/21: Pt continuing to ask if he will \"get better\". Appears anxious today and mildly tearful at one point.  Discuss the need to use the strategies he is practicing in therapy to be more successful in the areas he is concerned with which is mostly memory.  Educated how important attention is in relation to memory.  Reviewed attention and memory strategies. He will need continued reinforcement of this education.     8/10/21: Pt asked " "about his \"condition\" and SLP used the recommendations in the neuropsychology evaluation to ask if pt had completed any counseling. Pt stated he did not, but proceeded to discuss multiple stressors (Pandemic, tenants in his building passing away from Covid, difficulties with work, tax difficulties at work). SLP recommended he consider the impact of anxiety and stress on his ability to fully attend and recall and that he seek out counseling services to see if they are helpful.    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 SLP instructed pt to write out a schedule that was achievable and determine deadline in order to complete in a timely manner and provide structure since he reports this is very difficult for him to do.  He reported that he was going to the office to decrease distractions.       8/3/2021: Standardized Touchscreen Assessment of Cognition (STAC).  See below.     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       7/20/21: Reviewed pt's neuropsychological evaluation and discussed strategies recommended in report, specifically behavioral modifications including daily routines to assist with completion of tasks, using a central location for phone, keys, etc.  Discussed placing phone in pocket vs room that he is in to help with phone location.       "

## 2021-09-02 ENCOUNTER — HOSPITAL ENCOUNTER (OUTPATIENT)
Dept: SPEECH THERAPY | Facility: REHABILITATION | Age: 71
Setting detail: THERAPIES SERIES
Discharge: HOME | End: 2021-09-02
Attending: PSYCHIATRY & NEUROLOGY
Payer: MEDICARE

## 2021-09-02 DIAGNOSIS — R41.841 COGNITIVE COMMUNICATION DEFICIT: Primary | ICD-10-CM

## 2021-09-02 PROCEDURE — 92507 TX SP LANG VOICE COMM INDIV: CPT | Mod: GN

## 2021-09-04 NOTE — PROGRESS NOTES
SLP PROGRESS NOTE FOR OUTPATIENT THERAPY    Patient: Khanh Dela Cruz   MRN: [id number]  : 1950 71 y.o.  Referring Physician: Amita Moses MD  Date of Visit: 2021      Certification Dates: 21 through 10/01/21    Recommended Frequency & Duration:  2 times/week for up to 3 months        Diagnosis:   1. Cognitive communication deficit        Chief Complaints:  Chief Complaint   Patient presents with   • Cognition       Precautions:   Existing Precautions/Restrictions: no known precautions/restrictions    TODAY'S VISIT:    General Information - 21 1514        Session Details    Document Type  progress note     Mode of Treatment  individual therapy;speech language pathology     Patient/Family/Caregiver Comments/Observations  Pleasant, cooperative.  No c/o pain, no changes in meds, no falls.  Pt arrived 10 mins late to session.        SLP Time Calculation    Start Time  1510     Stop Time  1605     Time Calculation (min)  55 min        General Information    Referring Physician  Amita Moses MD     Existing Precautions/Restrictions  no known precautions/restrictions     Limitations/Impairments  glasses           Pain and Vitals - 21 1514        Pain Assessment    Currently in pain  No/Denies        Pain Interventions    Intervention  none     Post Intervention Comments  none         SLP - 21 1514        Speech Therapy    Speech Therapy  Cognition        SLP Frequency and Duration    Frequency of treatment  2 times/week     Speech Duration  3 months     SLP Cert From  21     SLP Cert To  10/01/21     Date SLP POC was sent to provider  21     Signed SLP Plan of Care received?   Yes             OBJECTIVE MEASUREMENTS/DATA:    Today's Treatment::    Cognitive flowsheet     COGNITIVE GOALS                       CURRENT SESSION   Pt. to demonstrate increased ST/Working memory to >/=85% with min cues/assist to apply strategies.  9/3/2021: Prospective memory task:  "SLP set alarm and pt was tasked to obtain information from hallway (4 facts).  Pt went immediately to hallway to obtain information when alarm went off, however, he did not return immediately and instead of getting 4 pieces of info from one piece of artwork, was gathering 4 pieces of info from 4 pieces of artwork.  Delayed recall of 4 pieces from one after 30 minutes: .  ? Attn when SLP provided initial directions.      2021: WM: 4 words - Category Inclusion: 60% (6/10) w/repetition provided.  4 words: Rankin% (6/10). Pt demonstrated difficulty 'holding onto words\" in order to manipulate.      2021: Instructed in compensatory memory strategies and provided examples for each.  Pt reports using a calendar to keep up with appts. Pt continues to report decreased WM.  Discussed writing info down as he thinks of things (eg. Index card) for improved recall.       Mental Manipulation: Opposites with a Delay (n-back 1) Pt had to provide the opposite for first word presented:  88% (44/50). Mild delays noted.     Pt. to demonstrate improved new learning/retention of information  to >/=85% with min cues for use of strategies.   (paragraph recall/NELL Talks, games) 8/3/2021: Revisited SET game.  Reviewed card characteristics and pt's ability to make a set given two cards. Began game play: Pt required mod assist to determine a SET, however, he was able to state when incorrectly choosing cards for a SET why it could not be a SET based on card traits.    8/10/21: \"set\" with F3 items for picking 3rd item  ind; naming 3rd item with visual cue (chart of options):  ind; Naming 3rd item with no visual cue: 10/10 ind 2x self correctionsx. Pt required mod-max A with grid.      2021: Delayed recall of short reading passage (\"Smokey the Bear\"): Pt was able to recall 5/6 (83%) facts after 25 mins.    Delayed recall of 4 pieces of info from last session: 3/4 I'ly; w/min verbal cue: .       8/3/2021: Delayed " "recall of 4 pieces of information from artwork after 20 mins: 3/4 I'ly; 4/4 w/ability to self correct whe pt stated incorrect last name, and then able to correct when SLP asked to \"think it through.\"     Pt. to demonstrate use of external memory aides at  mod I level to maintain current work/home schedules.    9/3/2021: Pt is using his calendar and alerts to remind him of deadlines.  He reports he has three tax returns to complete which are \"tricky.\" Pt reported he was going to possibly swing by a client on way home who lives in area near hospital. Pt was encouraged to leave a message during session to assist with completing/following through with tasks. Pt did make call.     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  Pt reports these two calendars work well for him. Pt showed SLP a bundle of business cards that had various notes on them.  Discussed taking notes from cards, which were not necessarily related to business card they were on, and placing in notes, or alternatively in calendar to \"jog\" patient to follow up if indicated.  Discussed use of phone alerts.     Pt. to demonstrate intact higher level word retrieval/thought organization efficiency to >/=85% with min cues for use of strategies.  7/22/2021: SOPHIE F-A-S: 44 (ave 48: SD13) - WNL.    Pt. to demonstrate intact auditory and visual attention/concentration and focus for mid-higher level attention tasks to >85%% accuracy (for >30 mins) 9/3/2021: Visual attn: \"Jeremiah Says\" - Pt had to follow directions where he had to replace, delete, reverse order etc of letters, vowels and consonants to form a new message.  Pt demonstrated logical approach to task and attn to specifics (after initial cues): 90% (9/10) - one min error. Pt completed half of task.     8/12/21: mod level attention task Find the Sentence=50%, errors due to decreased attention to detail and " "organization, able to increase to 100% with mod-max structure.     8/5/2021: Visual attn task: \"3s and 5s\" Pt had to locate all numbers including both 3 and 5 from a visually confusing page.  Pt used strategy of making columns on page to help w/finding all numbers: 1st attempt: 20/21 (95%); second attempt: 21/21.  Pt had more difficulty with second part of task - placing #s in ascending order: 19/21 (90%). Discussed more logical approach to task than what pt had done (eg. Listing 2 digit numbers, then 3 digit numbers, etc)  Mildly extended time to complete due to disorganized approach.       7/22/2021: Auditory Divided Attn: Orally presented names - pt had to listen for names beginning with an /m/ and names ending with a long /e/: 36/42 (86%); 34/38 (89%). Approx. 4 mins in length.     AQT: A-1 Color: 28 sec (<25 second) - slower than normal; Form: 53 sec (<30 sec) - non normal; Color/Form: 81 sec (<60 sec) - non normal.      7/20/2021: Pt [place] it [place].  Discussed choosing schedule that would work best for him and to make it a routine (as recommended by neuropsych evaluation).  Also discussed reward when he completes task.  Discussed best times of day to work.     Pt. to demonstrate intact auditory processing speed/comp to >/=85% via APT assessment.   8/3/2021: card processing task: pt had to alternate between two categories generating items per card color (eg red: fruits/vegs, black: occupations): Pt completed in 6 minutes 24 seconds; pt repeated 10 generated items; errors x 2.     Pt. to utilize organization and planning skills to manage mod-high level tasks w/min assist for strategy use. 9/3/2021: Focus of pt's session has been on improving his ability to complete task, determine deadlines, determine best times in which to complete tasks, decrease environmental distractions, etc.  Pt did report he was able to complete some tax returns while on vacation.  Discussed plan " "for completion of remaining three for this upcoming week.  Pt to report back.      8/3/2021: EF/Dvided Attn: [place] Activity: Pt had to draw a line from letter A to number 1 and continue up through letter J to number 10: 100% accuracy. Pt reported that during STAC assessment today, he was unable to correct mistake.  Pt demonstrated intact abilities via paper/pencil.     Education 9/3/2021: Continue to encourage possible psychology services since pt continues to demonstrate concerns, and \"teariness\" at times related to current functioning.  SLP also discussed his schedule as it relates to cancellations due to being away, and if pt is unable to return within the next 2 weeks, that a possible discharge would be likely.  SLP discussed importance of consistent frequency of visits to follow up on pt's consistent use of strategies. Pt does report that he is using some strategies from neuropsych evaluation as well as strategies discussed in session to improve current performance.     8/31/21: Pt continues to ask questions regarding current cognitive functioning.  Again, discussed that strategy use will help with current difficulties with memory and attention. Pt may benefit from psychology services to decrease anxiety.  Pt had reported that he completed 5-6 tax returns, but that new ones have come in.  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      8/12/21: Pt continuing to ask if he will \"get better\". Appears anxious today and mildly tearful at one point.  Discuss the need to use the strategies he is practicing in therapy to be more successful in the areas he is concerned with which is mostly memory.  Educated how important attention is in relation to memory.  Reviewed attention and memory strategies. He will need continued reinforcement of this education.     8/10/21: Pt asked about his \"condition\" and SLP used the " recommendations in the neuropsychology evaluation to ask if pt had completed any counseling. Pt stated he did not, but proceeded to discuss multiple stressors (Pandemic, tenants in his building passing away from Covid, difficulties with work, tax difficulties at work). SLP recommended he consider the impact of anxiety and stress on his ability to fully attend and recall and that he seek out counseling services to see if they are helpful.    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 SLP instructed pt to write out a schedule that was achievable and determine deadline in order to complete in a timely manner and provide structure since he reports this is very difficult for him to do.  He reported that he was going to the office to decrease distractions.       8/3/2021: Standardized Touchscreen Assessment of Cognition (STAC).  See below.     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       7/20/21: Reviewed pt's neuropsychological evaluation and discussed strategies recommended in report, specifically behavioral modifications including daily routines to assist with completion of tasks, using a central location for phone, keys, etc.  Discussed placing phone in pocket vs room that he is in to help with phone location.           Goals Addressed                    This Visit's Progress       General    •  ST STGs and LTGs (pt-stated)         Improve memory    Short Term Goals Time Frame  Result  Comment/Progress    Pt. to demonstrate increased ST/Working memory to >/=85% with min cues/assist to apply strategies.  6-8 weeks Ongoing 9/2/2021:  WM: 4 word strings: 60% w/repetition. Pt demonstrated difficulty manipulating 4 word.    Delayed recall after 30 minis of 4 pieces of info: 4/4.   Pt. to demonstrate  "improved new learning/retention of information  to >/=85% with min cues for use of strategies.   (paragraph recall/NELL Talks, games) 6-8 weeks Ongoing 9/22021: Delayed recall of facts from short passage after 25 mins: 83%    Pt. to demonstrate use of external memory aides at  mod I level to maintain current work/home schedules.    6-8 weeks MET 9/2/2021: Pt uses his calendars consistently to maintain appts, etc.   Pt. to demonstrate intact higher level word retrieval/thought organization efficiency to >/=85% with min cues for use of strategies.  6-8 weeks MET 9/2/2021: Pt demonstrates thought organization/expressive language WFL.   Pt. to demonstrate intact auditory attention/concentration and focus for mid-higher level attention tasks to >85%% accuracy (for >30 mins) 6-8 weeks  Ongoing 9/2/2021: Visual attn ranging from %; decreased attn to detail noted; w/cues for organization to 100%.     Pt. to demonstrate intact auditory processing speed/comp to >/=85% via APT assessment.   6-8 weeks Ongoing     Pt. to utilize organization and planning skills to manage mod-high level tasks w/min assist for strategy use. 6-8 weeks Ongoing 9/2/2021: Pt continues to report difficulties with completing tasks (eg tax returns). SLP and pt have discussed possible internal and external distractions getting in the way of his best function.  Most recently he completed 5 returns, however, reports he is not as \"quick as he used to be.\" Continue to discuss if pt is as committed to working as he had been.  He is down at the beach, which has decreased frequency of ST visits.  Discussed possible D/C due to inconsistent attendance.    LTG #1: FCM 6-7 for Memory 12 weeks Ongoing 7/2/2021: 5 for Memory   LTG #2: Pt to use compensatory cog comm strategies at mod I level for improved overall function in home/work settings.   12 weeks Ongoing      Clinical Impression: Pt  continues to be seen for limited sessions due to his vacation schedule " and initially scheduling difficulties. Pt's main complaint continues to be recall, as well as ability to complete tasks in a timely manner.  Focus of sessions has been on discussing possible internal and external barriers getting in the way of his best functioning, as well as consistent routines. Continue to delineate neuropsychological recommendations for improved functioning. Psychology services have been suggested since pt often becomes teary about current cognitive functioning. Pt demonstrates difficulty with WM tasks, which may be attentional in nature and exacerbated by patient's anxiety. This was discussed with pt.  Pt's attendance has been limited due to his vacation schedule, and SLP discussed possible D/C if pt does not attend more regularly in the upcoming weeks.

## 2021-09-04 NOTE — OP SLP TREATMENT LOG
"Cognitive flowsheet     COGNITIVE GOALS                       CURRENT SESSION   Pt. to demonstrate increased ST/Working memory to >/=85% with min cues/assist to apply strategies.  9/3/2021: Prospective memory task: SLP set alarm and pt was tasked to obtain information from hallway (4 facts).  Pt went immediately to hallway to obtain information when alarm went off, however, he did not return immediately and instead of getting 4 pieces of info from one piece of artwork, was gathering 4 pieces of info from 4 pieces of artwork.  Delayed recall of 4 pieces from one after 30 minutes: /.  ? Attn when SLP provided initial directions.      2021: WM: 4 words - Category Inclusion: 60% (6/10) w/repetition provided.  4 words: Rankin% (6/10). Pt demonstrated difficulty 'holding onto words\" in order to manipulate.      2021: Instructed in compensatory memory strategies and provided examples for each.  Pt reports using a calendar to keep up with appts. Pt continues to report decreased WM.  Discussed writing info down as he thinks of things (eg. Index card) for improved recall.       Mental Manipulation: Opposites with a Delay (n-back 1) Pt had to provide the opposite for first word presented:  88% (44/50). Mild delays noted.     Pt. to demonstrate improved new learning/retention of information  to >/=85% with min cues for use of strategies.   (paragraph recall/NELL Talks, games) 8/3/2021: Revisited SET game.  Reviewed card characteristics and pt's ability to make a set given two cards. Began game play: Pt required mod assist to determine a SET, however, he was able to state when incorrectly choosing cards for a SET why it could not be a SET based on card traits.    8/10/21: \"set\" with F3 items for picking 3rd item  ind; naming 3rd item with visual cue (chart of options):  ind; Naming 3rd item with no visual cue: 10/10 ind 2x self correctionsx. Pt required mod-max A with grid.      2021: Delayed recall of " "short reading passage (\"Smokey the Bear\"): Pt was able to recall 5/6 (83%) facts after 25 mins.    Delayed recall of 4 pieces of info from last session: 3/4 I'ly; w/min verbal cue: 4/4.       8/3/2021: Delayed recall of 4 pieces of information from artwork after 20 mins: 3/4 I'ly; 4/4 w/ability to self correct whe pt stated incorrect last name, and then able to correct when SLP asked to \"think it through.\"     Pt. to demonstrate use of external memory aides at  mod I level to maintain current work/home schedules.    9/3/2021: Pt is using his calendar and alerts to remind him of deadlines.  He reports he has three tax returns to complete which are \"tricky.\" Pt reported he was going to possibly swing by a client on way home who lives in area near hospital. Pt was encouraged to leave a message during session to assist with completing/following through with tasks. Pt did make call.     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  Pt reports these two calendars work well for him. Pt showed SLP a bundle of business cards that had various notes on them.  Discussed taking notes from cards, which were not necessarily related to business card they were on, and placing in notes, or alternatively in calendar to \"jog\" patient to follow up if indicated.  Discussed use of phone alerts.     Pt. to demonstrate intact higher level word retrieval/thought organization efficiency to >/=85% with min cues for use of strategies.  7/22/2021: SOPHIE F-A-S: 44 (ave 48: SD13) - WNL.    Pt. to demonstrate intact auditory and visual attention/concentration and focus for mid-higher level attention tasks to >85%% accuracy (for >30 mins) 9/3/2021: Visual attn: \"Jeremiah Says\" - Pt had to follow directions where he had to replace, delete, reverse order etc of letters, vowels and consonants to form a new message.  Pt demonstrated logical approach to task " "and attn to specifics (after initial cues): 90% (9/10) - one min error. Pt completed half of task.     8/12/21: mod level attention task Find the Sentence=50%, errors due to decreased attention to detail and organization, able to increase to 100% with mod-max structure.     8/5/2021: Visual attn task: \"3s and 5s\" Pt had to locate all numbers including both 3 and 5 from a visually confusing page.  Pt used strategy of making columns on page to help w/finding all numbers: 1st attempt: 20/21 (95%); second attempt: 21/21.  Pt had more difficulty with second part of task - placing #s in ascending order: 19/21 (90%). Discussed more logical approach to task than what pt had done (eg. Listing 2 digit numbers, then 3 digit numbers, etc)  Mildly extended time to complete due to disorganized approach.       7/22/2021: Auditory Divided Attn: Orally presented names - pt had to listen for names beginning with an /m/ and names ending with a long /e/: 36/42 (86%); 34/38 (89%). Approx. 4 mins in length.     AQT: A-1 Color: 28 sec (<25 second) - slower than normal; Form: 53 sec (<30 sec) - non normal; Color/Form: 81 sec (<60 sec) - non normal.      7/20/2021: Pt reports finding it difficult to [place].  Discussed choosing schedule that would work best for him and to make it a routine (as recommended by neuropsych evaluation).  Also discussed reward when he completes task.  Discussed best times of day to work.     Pt. to demonstrate intact auditory processing speed/comp to >/=85% via APT assessment.   8/3/2021: card processing task: pt had to alternate between two categories generating items per card color (eg red: fruits/vegs, black: occupations): Pt completed in 6 minutes 24 seconds; pt repeated 10 generated items; errors x 2.     Pt. to utilize organization and planning skills to manage mod-high level tasks w/min assist for strategy use. 9/3/2021: Focus of pt's session has been on improving his ability to complete " "task, determine deadlines, determine best times in which to complete tasks, decrease environmental distractions, etc.  Pt did report he was able to complete some tax returns while on vacation.  Discussed plan for completion of remaining three for this upcoming week.  Pt to report back.      8/3/2021: EF/Dvided Attn: [place] Activity: Pt had to draw a line from letter A to number 1 and continue up through letter J to number 10: 100% accuracy. Pt reported that during STAC assessment today, he was unable to correct mistake.  Pt demonstrated intact abilities via paper/pencil.     Education 9/3/2021: Continue to encourage possible psychology services since pt continues to demonstrate concerns, and \"teariness\" at times related to current functioning.  SLP also discussed his schedule as it relates to cancellations due to being away, and if pt is unable to return within the next 2 weeks, that a possible discharge would be likely.  SLP discussed importance of consistent frequency of visits to follow up on pt's consistent use of strategies. Pt does report that he is using some strategies from neuropsych evaluation as well as strategies discussed in session to improve current performance.     8/31/21: Pt continues to ask questions regarding current cognitive functioning.  Again, discussed that strategy use will help with current difficulties with memory and attention. Pt may benefit from psychology services to decrease anxiety.  Pt had reported that he completed 5-6 tax returns, but that new ones have come in.  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      8/12/21: Pt continuing to ask if he will \"get better\". Appears anxious today and mildly tearful at one point.  Discuss the need to use the strategies he is practicing in therapy to be more successful in the areas he is concerned with which is mostly memory.  Educated " "how important attention is in relation to memory.  Reviewed attention and memory strategies. He will need continued reinforcement of this education.     8/10/21: Pt asked about his \"condition\" and SLP used the recommendations in the neuropsychology evaluation to ask if pt had completed any counseling. Pt stated he did not, but proceeded to discuss multiple stressors (Pandemic, tenants in his building passing away from Covid, difficulties with work, tax difficulties at work). SLP recommended he consider the impact of anxiety and stress on his ability to fully attend and recall and that he seek out counseling services to see if they are helpful.    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 SLP instructed pt to write out a schedule that was achievable and determine deadline in order to complete in a timely manner and provide structure since he reports this is very difficult for him to do.  He reported that he was going to the office to decrease distractions.       8/3/2021: Standardized Touchscreen Assessment of Cognition (STAC).  See below.     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       7/20/21: Reviewed pt's neuropsychological evaluation and discussed strategies recommended in report, specifically behavioral modifications including daily routines to assist with completion of tasks, using a central location for phone, keys, etc.  Discussed placing phone in pocket vs room that he is in to help with phone location.       "

## 2021-09-21 ENCOUNTER — HOSPITAL ENCOUNTER (OUTPATIENT)
Dept: SPEECH THERAPY | Facility: REHABILITATION | Age: 71
Setting detail: THERAPIES SERIES
Discharge: HOME | End: 2021-09-21
Attending: PSYCHIATRY & NEUROLOGY
Payer: MEDICARE

## 2021-09-21 DIAGNOSIS — R41.841 COGNITIVE COMMUNICATION DEFICIT: Primary | ICD-10-CM

## 2021-09-21 PROCEDURE — 92507 TX SP LANG VOICE COMM INDIV: CPT | Mod: GN

## 2021-09-21 NOTE — OP SLP TREATMENT LOG
"Cognitive flowsheet     COGNITIVE GOALS                       CURRENT SESSION   Pt. to demonstrate increased ST/Working memory to >/=85% with min cues/assist to apply strategies.  9/21/2021: WM (n-back 1): 86% (43/50). Pt had to provide opposite for word presented just prior to the last word (n-back 1).    Prospective recall: when hearing an alert (every 15 mins x 3), pt had to provide specific info: 2/3 I'ly; 3/3 w/min verbal cue.     Pt. to demonstrate improved new learning/retention of information  to >/=85% with min cues for use of strategies.   (paragraph recall/NELL Talks, games) 9/21/2021: [facility] of [facility] Ideational Recall (paragraph) via open-ended questions: 80% (initial evaluation: 50%).    Pt. to demonstrate use of external memory aides at  mod I level to maintain current work/home schedules.    9/21/2021: Has demonstrated intact ability to manage his calendars to complete work/keep scheduled appointments.    Pt. to demonstrate intact higher level word retrieval/thought organization efficiency to >/=85% with min cues for use of strategies.  9/21/2021: Pt has demonstrated word retrieval/thought organization WFL.    Pt. to demonstrate intact auditory and visual attention/concentration and focus for mid-higher level attention tasks to >85%% accuracy (for >30 mins) 9/21/2021: \"Jeremiah Says:\" Pt had to delete, change, add letters to a message to come up with a completely new message: overall task accuracy 88% (16/18).      Pt. to demonstrate intact auditory processing speed/comp to >/=85% via APT assessment.       Pt. to utilize organization and planning skills to manage mod-high level tasks w/min assist for strategy use. 9/21/2021: Pt reported he completed approx. 6 tax returns since last session. SLP reviewed recommendations delineated on neuropsychological assessment as it related to establishing routine, minimizing distractions, completing tasks one at a time, and setting short and long-term " goals. Also discussed working at optimal time and providing self breaks.      Education 8/31/21: Pt continues to ask questions regarding current cognitive functioning.  Again, discussed that strategy use will help with current difficulties with memory and attention. Pt may benefit from psychology services to decrease anxiety.  Pt had reported that he completed 5-6 tax returns, but that new ones have come in.  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          Other

## 2021-09-22 NOTE — PROGRESS NOTES
SLP DISCHARGE NOTE FOR OUTPATIENT THERAPY    Patient: Khanh Dela Cruz   MRN: [id number]  : 1950 71 y.o.  Referring Physician: Amita Moses MD  Date of Visit: 2021      Certification Dates: 21 through 10/01/21    Total Visit Count: 10    Chief Complaints:   Chief Complaint   Patient presents with   • Cognition       Precautions:        TODAY'S VISIT:     General Information - 21 1109        Session Details    Document Type discharge evaluation     Mode of Treatment individual therapy; speech language pathology     Patient/Family/Caregiver Comments/Observations Pleasant, cooperative.  No c/o pain, no changes in meds, no falls.        SLP Time Calculation    Start Time 1105     Stop Time 1200     Time Calculation (min) 55 min        General Information    Referring Physician Amita Moses MD                Daily Falls Screen - 21 1109        Daily Falls Assessment    Patient reported fall since last visit No                Pain and Vitals - 21 1109        Pain Assessment    Currently in pain No/Denies        Pain Interventions    Intervention none     Post Intervention Comments none                SLP - 21 1109        Speech Therapy    Speech Therapy Cognition        SLP Frequency and Duration    Frequency of treatment 2 times/week     Speech Duration 3 months     SLP Cert From 21     SLP Cert To 10/01/21     Date SLP POC was sent to provider 21     Signed SLP Plan of Care received?  Yes                Assessment - 21 2009        Assessment    Clinical Assessment Khanh continued to be seen for limited sessions due to his vacation schedule and his working in [place]. Pt is being discharged this date due to max potential achieved. Recent progress report was dated 2021, and Khanh was seen for one additional session only. Pt reported completing six tax returns over the last two weeks. Focus of therapy has been on establishing  routines, determining barriers/modifications to improve completion of tasks. Reassessment of [facility] Ideational Recall (immediate paragraph recall via open-ended questions) indicated a 30% improvement from initial evaluation. SLP encouraged pt to follow recommendations delineated in neuropsychological evaluation. Psychology services were also suggested since pt continues to be concerned about current cog comm functioning. Pt achieved scores of >85% on tasks in today’s session which targeted WM and attention to detail. Pt has made gains in his therapy w/use of compensatory strategies and has demonstrated improvement in ability to complete work tasks in a timely manner.     Plan and Recommendations Discharge this date.                 OBJECTIVE MEASUREMENTS/DATA:    Functional Comm Measures    Functional Communication Measures - 09/21/21 2009        Functional Communication Measures    FCM: Memory 6-->Level 6                 Today's Treatment::    Cognitive flowsheet     COGNITIVE GOALS                       CURRENT SESSION   Pt. to demonstrate increased ST/Working memory to >/=85% with min cues/assist to apply strategies.  9/21/2021: WM (n-back 1): 86% (43/50). Pt had to provide opposite for word presented just prior to the last word (n-back 1).    Prospective recall: when hearing an alert (every 15 mins x 3), pt had to provide specific info: 2/3 I'ly; 3/3 w/min verbal cue.     Pt. to demonstrate improved new learning/retention of information  to >/=85% with min cues for use of strategies.   (paragraph recall/NELL Talks, games) 9/21/2021: Re-evaluation of [facility] Complex Ideational Recall (paragraph) via open-ended questions: 80% (initial evaluation: 50%).    Pt. to demonstrate use of external memory aides at  mod I level to maintain current work/home schedules.    9/21/2021: Has demonstrated intact ability to manage his calendars to complete work/keep scheduled appointments.    Pt. to demonstrate intact higher level word  "retrieval/thought organization efficiency to >/=85% with min cues for use of strategies.  9/21/2021: Pt has demonstrated word retrieval/thought organization WFL.    Pt. to demonstrate intact auditory and visual attention/concentration and focus for mid-higher level attention tasks to >85%% accuracy (for >30 mins) 9/21/2021: \"Jeremiah Says:\" Pt had to delete, change, add letters to a message to come up with a completely new message: overall task accuracy 88% (16/18).      Pt. to demonstrate intact auditory processing speed/comp to >/=85% via APT assessment.       Pt. to utilize organization and planning skills to manage mod-high level tasks w/min assist for strategy use. 9/21/2021: Pt reported he completed approx. 6 tax returns since last session. SLP reviewed recommendations delineated on neuropsychological assessment as it related to establishing routine, minimizing distractions, completing tasks one at a time, and setting short and long-term goals. Also discussed working at optimal time and providing self breaks.      Education 8/31/21: Pt continues to ask questions regarding current cognitive functioning.  Again, discussed that strategy use will help with current difficulties with memory and attention. Pt may benefit from psychology services to decrease anxiety.  Pt had reported that he completed 5-6 tax returns, but that new ones have come in.  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          Other            Goals:    Goals Addressed                    This Visit's Progress       General    •  COMPLETED: ST STGs and LTGs (pt-stated)         Improve memory    Short Term Goals Time Frame  Result  Comment/Progress    Pt. to demonstrate increased ST/Working memory to >/=85% with min cues/assist to apply strategies.  6-8 weeks Partially Met 9/21/2021:  WM: n-back 1: 88%  Prospective recall: 2/3 I'ly; 3/3 " w/min verbal cues.   Pt. to demonstrate improved new learning/retention of information  to >/=85% with min cues for use of strategies.   (paragraph recall/NELL Talks, games) 6-8 weeks Partially Met 9/212021: [facility] Complex Ideational Recall: 80%.    Pt. to demonstrate use of external memory aides at  mod I level to maintain current work/home schedules.    6-8 weeks MET 9/2/2021: Pt uses his calendars consistently to maintain appts, etc.   Pt. to demonstrate intact higher level word retrieval/thought organization efficiency to >/=85% with min cues for use of strategies.  6-8 weeks MET 9/2/2021: Pt demonstrates thought organization/expressive language WFL.   Pt. to demonstrate intact auditory attention/concentration and focus for mid-higher level attention tasks to >85%% accuracy (for >30 mins) 6-8 weeks  MET 9/21/2021: Visual attn: 85%.    Pt reports ability to focus on recent tax returns and completed 6 returns in last 2 weeks.   Pt. to demonstrate intact auditory processing speed/comp to >/=85% via APT assessment.   6-8 weeks DNT DNT.  Focus was on pt's functional recall, WM, attn, and EF skills for carryover into home/community/work environments.   Pt. to utilize organization and planning skills to manage mod-high level tasks w/min assist for strategy use. 6-8 weeks MET 9/21/2021: Pt reported successful completion of  six tax returns due 9/15.      LTG #1: FCM 6-7 for Memory 12 weeks Partially  Met 9/21/2021: 6 for Memory   LTG #2: Pt to use compensatory cog comm strategies at mod I level for improved overall function in home/work settings.   12 weeks MET                Discharge information for [facility]:    Reason for D/C: Maximum potential met (Pt met most goals. Pt continues to demonstrate mild cognitive communication deficits.)  Hospitalization during treatment: No  Increased independence: [place] in HEP, Increased functional activity, Increased functional independence  Increased production assessment: Increased  community independence, Return to work/school/volunteer activities, Return to household activities, Return to participation in hobbies/leisure pursuits, Return to driving  Interrupted for medical reason: No  Skin integrity: Intact